# Patient Record
Sex: MALE | Race: WHITE | Employment: FULL TIME
[De-identification: names, ages, dates, MRNs, and addresses within clinical notes are randomized per-mention and may not be internally consistent; named-entity substitution may affect disease eponyms.]

---

## 2017-01-17 ENCOUNTER — OFFICE VISIT (OUTPATIENT)
Dept: FAMILY MEDICINE CLINIC | Facility: CLINIC | Age: 62
End: 2017-01-17

## 2017-01-17 VITALS
HEART RATE: 75 BPM | SYSTOLIC BLOOD PRESSURE: 140 MMHG | HEIGHT: 72 IN | BODY MASS INDEX: 29.47 KG/M2 | DIASTOLIC BLOOD PRESSURE: 60 MMHG | WEIGHT: 217.6 LBS

## 2017-01-17 DIAGNOSIS — R97.20 ELEVATED PSA: ICD-10-CM

## 2017-01-17 DIAGNOSIS — Z95.5 STENTED CORONARY ARTERY: ICD-10-CM

## 2017-01-17 DIAGNOSIS — Z71.6 TOBACCO ABUSE COUNSELING: ICD-10-CM

## 2017-01-17 DIAGNOSIS — E78.2 MIXED HYPERLIPIDEMIA: ICD-10-CM

## 2017-01-17 DIAGNOSIS — I25.10 CORONARY ARTERY DISEASE INVOLVING NATIVE CORONARY ARTERY OF NATIVE HEART WITHOUT ANGINA PECTORIS: ICD-10-CM

## 2017-01-17 DIAGNOSIS — I10 ESSENTIAL HYPERTENSION: Primary | ICD-10-CM

## 2017-01-17 DIAGNOSIS — E66.09 NON MORBID OBESITY DUE TO EXCESS CALORIES: ICD-10-CM

## 2017-01-17 PROCEDURE — 99214 OFFICE O/P EST MOD 30 MIN: CPT | Performed by: FAMILY MEDICINE

## 2017-01-17 ASSESSMENT — ENCOUNTER SYMPTOMS
ABDOMINAL PAIN: 0
CHEST TIGHTNESS: 0
COUGH: 1
DIARRHEA: 0
CONSTIPATION: 0
RHINORRHEA: 0
BACK PAIN: 0
TROUBLE SWALLOWING: 0
NAUSEA: 0
SHORTNESS OF BREATH: 1
SORE THROAT: 0

## 2017-01-17 ASSESSMENT — PATIENT HEALTH QUESTIONNAIRE - PHQ9
2. FEELING DOWN, DEPRESSED OR HOPELESS: 0
1. LITTLE INTEREST OR PLEASURE IN DOING THINGS: 0
SUM OF ALL RESPONSES TO PHQ9 QUESTIONS 1 & 2: 0
SUM OF ALL RESPONSES TO PHQ QUESTIONS 1-9: 0

## 2017-04-25 ENCOUNTER — TELEPHONE (OUTPATIENT)
Dept: FAMILY MEDICINE CLINIC | Age: 62
End: 2017-04-25

## 2017-05-01 DIAGNOSIS — E78.2 MIXED HYPERLIPIDEMIA: ICD-10-CM

## 2017-05-02 RX ORDER — ATORVASTATIN CALCIUM 80 MG/1
80 TABLET, FILM COATED ORAL DAILY
Qty: 90 TABLET | Refills: 3 | Status: SHIPPED | OUTPATIENT
Start: 2017-05-02 | End: 2018-07-10 | Stop reason: SDUPTHER

## 2017-05-09 ENCOUNTER — OFFICE VISIT (OUTPATIENT)
Dept: FAMILY MEDICINE CLINIC | Age: 62
End: 2017-05-09
Payer: COMMERCIAL

## 2017-05-09 VITALS
WEIGHT: 225 LBS | DIASTOLIC BLOOD PRESSURE: 60 MMHG | HEART RATE: 69 BPM | SYSTOLIC BLOOD PRESSURE: 120 MMHG | BODY MASS INDEX: 30.48 KG/M2 | HEIGHT: 72 IN

## 2017-05-09 DIAGNOSIS — N40.0 ENLARGED PROSTATE: ICD-10-CM

## 2017-05-09 DIAGNOSIS — R97.20 ELEVATED PSA: ICD-10-CM

## 2017-05-09 DIAGNOSIS — Z95.5 STENTED CORONARY ARTERY: ICD-10-CM

## 2017-05-09 DIAGNOSIS — I10 ESSENTIAL HYPERTENSION: Primary | ICD-10-CM

## 2017-05-09 DIAGNOSIS — I25.10 CORONARY ARTERY DISEASE INVOLVING NATIVE CORONARY ARTERY OF NATIVE HEART WITHOUT ANGINA PECTORIS: ICD-10-CM

## 2017-05-09 DIAGNOSIS — E78.2 MIXED HYPERLIPIDEMIA: ICD-10-CM

## 2017-05-09 PROCEDURE — 99213 OFFICE O/P EST LOW 20 MIN: CPT | Performed by: FAMILY MEDICINE

## 2017-05-09 ASSESSMENT — ENCOUNTER SYMPTOMS
CONSTIPATION: 0
TROUBLE SWALLOWING: 0
BLOOD IN STOOL: 0
SORE THROAT: 0
ABDOMINAL PAIN: 0
SHORTNESS OF BREATH: 1
DIARRHEA: 0
COUGH: 0

## 2017-08-05 DIAGNOSIS — I10 ESSENTIAL HYPERTENSION: ICD-10-CM

## 2017-08-08 ENCOUNTER — OFFICE VISIT (OUTPATIENT)
Dept: FAMILY MEDICINE CLINIC | Age: 62
End: 2017-08-08
Payer: COMMERCIAL

## 2017-08-08 VITALS
SYSTOLIC BLOOD PRESSURE: 128 MMHG | HEIGHT: 72 IN | BODY MASS INDEX: 30.49 KG/M2 | HEART RATE: 73 BPM | DIASTOLIC BLOOD PRESSURE: 79 MMHG | WEIGHT: 225.13 LBS

## 2017-08-08 DIAGNOSIS — E78.2 MIXED HYPERLIPIDEMIA: ICD-10-CM

## 2017-08-08 DIAGNOSIS — Z95.5 STENTED CORONARY ARTERY: ICD-10-CM

## 2017-08-08 DIAGNOSIS — N40.1 BENIGN PROSTATIC HYPERPLASIA WITH LOWER URINARY TRACT SYMPTOMS, UNSPECIFIED MORPHOLOGY: ICD-10-CM

## 2017-08-08 DIAGNOSIS — I10 ESSENTIAL HYPERTENSION: Primary | ICD-10-CM

## 2017-08-08 DIAGNOSIS — F17.200 TOBACCO USE DISORDER: ICD-10-CM

## 2017-08-08 PROCEDURE — 99213 OFFICE O/P EST LOW 20 MIN: CPT | Performed by: FAMILY MEDICINE

## 2017-08-08 RX ORDER — LISINOPRIL 5 MG/1
TABLET ORAL
Qty: 90 TABLET | Refills: 3 | Status: SHIPPED | OUTPATIENT
Start: 2017-08-08 | End: 2018-08-14 | Stop reason: SDUPTHER

## 2017-08-08 ASSESSMENT — ENCOUNTER SYMPTOMS
RHINORRHEA: 0
SHORTNESS OF BREATH: 0
DIARRHEA: 0
CONSTIPATION: 0
ABDOMINAL PAIN: 0
SORE THROAT: 0
BACK PAIN: 0
COUGH: 0
NAUSEA: 0
TROUBLE SWALLOWING: 0
CHEST TIGHTNESS: 0

## 2017-12-12 ENCOUNTER — OFFICE VISIT (OUTPATIENT)
Dept: FAMILY MEDICINE CLINIC | Age: 62
End: 2017-12-12
Payer: COMMERCIAL

## 2017-12-12 VITALS
BODY MASS INDEX: 30.91 KG/M2 | HEART RATE: 58 BPM | WEIGHT: 228.2 LBS | HEIGHT: 72 IN | SYSTOLIC BLOOD PRESSURE: 121 MMHG | DIASTOLIC BLOOD PRESSURE: 78 MMHG

## 2017-12-12 DIAGNOSIS — I10 ESSENTIAL HYPERTENSION: ICD-10-CM

## 2017-12-12 DIAGNOSIS — E78.2 MIXED HYPERLIPIDEMIA: ICD-10-CM

## 2017-12-12 DIAGNOSIS — L57.0 ACTINIC KERATOSES: ICD-10-CM

## 2017-12-12 DIAGNOSIS — I25.10 CORONARY ARTERY DISEASE INVOLVING NATIVE CORONARY ARTERY OF NATIVE HEART WITHOUT ANGINA PECTORIS: ICD-10-CM

## 2017-12-12 DIAGNOSIS — F41.9 ANXIETY: ICD-10-CM

## 2017-12-12 DIAGNOSIS — L98.9 SKIN LESION OF LEFT ARM: Primary | ICD-10-CM

## 2017-12-12 DIAGNOSIS — F17.200 TOBACCO USE DISORDER: ICD-10-CM

## 2017-12-12 PROCEDURE — 99213 OFFICE O/P EST LOW 20 MIN: CPT | Performed by: FAMILY MEDICINE

## 2017-12-12 NOTE — PATIENT INSTRUCTIONS
Patient Education        High Blood Pressure: Care Instructions  Your Care Instructions    If your blood pressure is usually above 140/90, you have high blood pressure, or hypertension. That means the top number is 140 or higher or the bottom number is 90 or higher, or both. Despite what a lot of people think, high blood pressure usually doesn't cause headaches or make you feel dizzy or lightheaded. It usually has no symptoms. But it does increase your risk for heart attack, stroke, and kidney or eye damage. The higher your blood pressure, the more your risk increases. Your doctor will give you a goal for your blood pressure. Your goal will be based on your health and your age. An example of a goal is to keep your blood pressure below 140/90. Lifestyle changes, such as eating healthy and being active, are always important to help lower blood pressure. You might also take medicine to reach your blood pressure goal.  Follow-up care is a key part of your treatment and safety. Be sure to make and go to all appointments, and call your doctor if you are having problems. It's also a good idea to know your test results and keep a list of the medicines you take. How can you care for yourself at home? Medical treatment  · If you stop taking your medicine, your blood pressure will go back up. You may take one or more types of medicine to lower your blood pressure. Be safe with medicines. Take your medicine exactly as prescribed. Call your doctor if you think you are having a problem with your medicine. · Talk to your doctor before you start taking aspirin every day. Aspirin can help certain people lower their risk of a heart attack or stroke. But taking aspirin isn't right for everyone, because it can cause serious bleeding. · See your doctor regularly. You may need to see the doctor more often at first or until your blood pressure comes down.   · If you are taking blood pressure medicine, talk to your doctor before you arms.  ¨ Lightheadedness or sudden weakness. ¨ A fast or irregular heartbeat. ? · You have symptoms of a stroke. These may include:  ¨ Sudden numbness, tingling, weakness, or loss of movement in your face, arm, or leg, especially on only one side of your body. ¨ Sudden vision changes. ¨ Sudden trouble speaking. ¨ Sudden confusion or trouble understanding simple statements. ¨ Sudden problems with walking or balance. ¨ A sudden, severe headache that is different from past headaches. ? · You have severe back or belly pain. ?Do not wait until your blood pressure comes down on its own. Get help right away. ?Call your doctor now or seek immediate care if:  ? · Your blood pressure is much higher than normal (such as 180/110 or higher), but you don't have symptoms. ? · You think high blood pressure is causing symptoms, such as:  ¨ Severe headache. ¨ Blurry vision. ? Watch closely for changes in your health, and be sure to contact your doctor if:  ? · Your blood pressure measures 140/90 or higher at least 2 times. That means the top number is 140 or higher or the bottom number is 90 or higher, or both. ? · You think you may be having side effects from your blood pressure medicine. ? · Your blood pressure is usually normal, but it goes above normal at least 2 times. Where can you learn more? Go to [email or web address] and sign in to your "[employer] account. Enter C939 in the [facility] box to learn more about \"High Blood Pressure: Care Instructions. \"     If you do not have an account, please click on the \"Sign Up Now\" link. Current as of: September 21, 2016  Content Version: 11.4  © 5629-9438 [employer] Care instructions adapted under license by [facility] ([facility]).  If you have questions about a medical condition or this instruction, always ask your healthcare professional. Ernesto Muhammad any warranty or liability for your use of this

## 2017-12-12 NOTE — PROGRESS NOTES
Chronic Disease Visit Information    BP Readings from Last 3 Encounters:   08/08/17 128/79   05/09/17 120/60   01/17/17 140/60          LDL Cholesterol (mg/dL)   Date Value   01/14/2017 159 (H)   01/14/2017 159 (H)     LDL Calculated (mg/dL)   Date Value   05/27/2015 76     HDL (mg/dL)   Date Value   01/14/2017 38 (L)   01/14/2017 38 (L)     BUN (mg/dL)   Date Value   01/14/2017 12     CREATININE (mg/dL)   Date Value   01/14/2017 1.12     Glucose (mg/dL)   Date Value   01/14/2017 83            Have you changed or started any medications since your last visit including any over-the-counter medicines, vitamins, or herbal medicines? no   Are you having any side effects from any of your medications? -  no  Have you stopped taking any of your medications? Is so, why? -  no    Have you seen any other physician or provider since your last visit? Yes- Cardiologist  Have you had any other diagnostic tests since your last visit? No  Have you been seen in the emergency room and/or had an admission to a hospital since we last saw you? No  Have you had your annual diabetic retinal (eye) exam? No  Have you had your routine dental cleaning in the past 6 months? no    Have you activated your [employer] account? If not, what are your barriers?  Yes     Patient Care Team:  Pb Anaya MD as PCP - General (Family Medicine)  Cris Valdivia MD as Consulting Physician (Gastroenterology)         Medical History Review  Past Medical, Family, and Social History reviewed and does contribute to the patient presenting condition    Health Maintenance   Topic Date Due    Smoker: low dose lung CT screening  05/21/2009    Hepatitis C screen  05/09/2018 (Originally 1955)    HIV screen  05/09/2018 (Originally 5/21/1969)    Lipid screen  01/14/2022    DTaP/Tdap/Td vaccine (2 - Td) 04/05/2022    Colon cancer screen colonoscopy  10/27/2024    Zostavax vaccine  Addressed    Flu vaccine  Completed

## 2017-12-20 ASSESSMENT — ENCOUNTER SYMPTOMS
SHORTNESS OF BREATH: 0
ABDOMINAL PAIN: 0
TROUBLE SWALLOWING: 0
NAUSEA: 0
CONSTIPATION: 0
BACK PAIN: 0
SORE THROAT: 0
DIARRHEA: 0
RHINORRHEA: 0
CHEST TIGHTNESS: 0
COUGH: 0

## 2017-12-21 ENCOUNTER — HOSPITAL ENCOUNTER (OUTPATIENT)
Age: 62
Setting detail: SPECIMEN
Discharge: HOME | End: 2017-12-21
Payer: COMMERCIAL

## 2017-12-21 NOTE — PROGRESS NOTES
Respiratory: Negative for cough, chest tightness and shortness of breath. Cardiovascular: Negative for chest pain, palpitations and leg swelling. Gastrointestinal: Negative for abdominal pain, constipation, diarrhea and nausea. Endocrine: Negative for polydipsia and polyuria. Genitourinary: Negative for difficulty urinating, dysuria and hematuria. Musculoskeletal: Negative for arthralgias, back pain, gait problem and neck pain. Skin: Negative for rash. Neurological: Negative for dizziness, light-headedness and headaches. Psychiatric/Behavioral: Positive for sleep disturbance. Negative for dysphoric mood. The patient is not nervous/anxious. Objective:     Physical Exam   Constitutional: He is oriented to person, place, and time. He appears well-developed and well-nourished. HENT:   Head: Normocephalic. Nose: Mucosal edema present. Mouth/Throat: No oropharyngeal exudate. Nose is congested and wet. Narrow nasal passages. Sinuses are mildly tender. Eyes: Pupils are equal, round, and reactive to light. No scleral icterus. Neck: Normal range of motion. Neck supple. No JVD present. Cardiovascular: Normal rate, regular rhythm and normal heart sounds. Exam reveals no gallop. No murmur heard. Pulmonary/Chest: Effort normal. He has no wheezes. He has no rales. Breath sounds are diminished. Abdominal: Soft. Bowel sounds are normal. He exhibits no mass. There is no tenderness. Liver & spleen are not palpable. Musculoskeletal: Normal range of motion. He exhibits no edema or tenderness. Lumbar back: Normal.   He moves all his limbs well. He is able to lie down and sit up. Gait is stable. Lymphadenopathy:     He has no cervical adenopathy. Neurological: He is alert and oriented to person, place, and time. He has normal reflexes. Coordination and gait normal.   Skin: Skin is warm and dry. His forearms skin is dry. They're flaky lesions present.   There is one large lesion half inch in diameter which is raised. It is dry. Psychiatric: He has a normal mood and affect. His behavior is normal. Thought content normal.   Nursing note and vitals reviewed. /78 (Site: Left Arm, Position: Sitting, Cuff Size: Large Adult)   Pulse 58   Ht 6' (1.829 m)   Wt 228 lb 3.2 oz (103.5 kg)   BMI 30.95 kg/m²     Assessment:      1. Skin lesion of left arm  Amb External Referral To Dermatology   2. Actinic keratoses  Amb External Referral To Dermatology   3. Essential hypertension     4. Coronary artery disease involving native coronary artery of native heart without angina pectoris     5. Mixed hyperlipidemia     6. Anxiety     7. Tobacco use disorder         Plan:      Return in about 3 months (around 3/12/2018). Orders Placed This Encounter   Procedures    Amb External Referral To Dermatology     Referral Priority:   Routine     Referral Type:   Consult for Advice and Opinion     Referral Reason:   Specialty Services Required     Referred to Provider:   Niharika Phillips MD     Requested Specialty:   Dermatology     Number of Visits Requested:   1     No orders of the defined types were placed in this encounter. Findings and/or pathophysiology discussed with patient. Plan of treatment discussed. Patient's medical problems were discussed with him. Findings were explained. Chart was evaluated. Available lab work and tests results were discussed. His skin problems were discussed with him. Will refer patient to his dermatologist.  His medications were reviewed. Health maintenance was discussed. Weight management was discussed. Diet and activity were discussed. Patient advised not to smoke at all. Also to reduce alcohol intake. 1.  Ronal Ramírez received counseling on the following healthy behaviors: nutrition, exercise, medication adherence, tobacco cessation and decrease in alcohol consumption  2.   Patient given educational materials - see patient

## 2017-12-29 LAB — DERMATOLOGY PATHOLOGY REPORT: NORMAL

## 2018-07-10 ENCOUNTER — OFFICE VISIT (OUTPATIENT)
Dept: FAMILY MEDICINE CLINIC | Age: 63
End: 2018-07-10
Payer: COMMERCIAL

## 2018-07-10 ENCOUNTER — HOSPITAL ENCOUNTER (OUTPATIENT)
Age: 63
Discharge: HOME | End: 2018-07-10
Payer: COMMERCIAL

## 2018-07-10 VITALS
WEIGHT: 219.6 LBS | BODY MASS INDEX: 29.78 KG/M2 | HEART RATE: 60 BPM | DIASTOLIC BLOOD PRESSURE: 66 MMHG | SYSTOLIC BLOOD PRESSURE: 127 MMHG

## 2018-07-10 DIAGNOSIS — Z72.89 OTHER PROBLEMS RELATED TO LIFESTYLE: ICD-10-CM

## 2018-07-10 DIAGNOSIS — E78.2 MIXED HYPERLIPIDEMIA: ICD-10-CM

## 2018-07-10 DIAGNOSIS — Z13.89 SCREENING FOR GOUT: ICD-10-CM

## 2018-07-10 DIAGNOSIS — I10 ESSENTIAL HYPERTENSION: Primary | ICD-10-CM

## 2018-07-10 LAB
ALT SERPL-CCNC: 22 U/L (ref 5–41)
AST SERPL-CCNC: 22 U/L
CHOLESTEROL, FASTING: 234 MG/DL
CHOLESTEROL/HDL RATIO: 7.8
HDLC SERPL-MCNC: 30 MG/DL
LDL CHOLESTEROL: 137 MG/DL (ref 0–130)
TRIGLYCERIDE, FASTING: 336 MG/DL
VLDLC SERPL CALC-MCNC: ABNORMAL MG/DL (ref 1–30)

## 2018-07-10 PROCEDURE — 84460 ALANINE AMINO (ALT) (SGPT): CPT

## 2018-07-10 PROCEDURE — 36415 COLL VENOUS BLD VENIPUNCTURE: CPT

## 2018-07-10 PROCEDURE — 80061 LIPID PANEL: CPT

## 2018-07-10 PROCEDURE — 84450 TRANSFERASE (AST) (SGOT): CPT

## 2018-07-10 PROCEDURE — 99213 OFFICE O/P EST LOW 20 MIN: CPT | Performed by: PHYSICIAN ASSISTANT

## 2018-07-10 RX ORDER — CARVEDILOL 6.25 MG/1
TABLET ORAL
COMMUNITY
Start: 2018-06-09

## 2018-07-10 RX ORDER — ATORVASTATIN CALCIUM 80 MG/1
80 TABLET, FILM COATED ORAL DAILY
Qty: 90 TABLET | Refills: 1 | Status: SHIPPED | OUTPATIENT
Start: 2018-07-10 | End: 2018-11-06 | Stop reason: SDUPTHER

## 2018-07-10 ASSESSMENT — PATIENT HEALTH QUESTIONNAIRE - PHQ9
1. LITTLE INTEREST OR PLEASURE IN DOING THINGS: 0
2. FEELING DOWN, DEPRESSED OR HOPELESS: 0
SUM OF ALL RESPONSES TO PHQ QUESTIONS 1-9: 0
SUM OF ALL RESPONSES TO PHQ9 QUESTIONS 1 & 2: 0

## 2018-07-10 NOTE — PATIENT INSTRUCTIONS
your [employer] account. Enter H331 in the [facility] box to learn more about \"Well Visit, Men 48 to 72: Care Instructions. \"     If you do not have an account, please click on the \"Sign Up Now\" link. Current as of: Haydee 10, 2017  Content Version: 11.6  © 1095-6586 [employer]. Care instructions adapted under license by [facility] ([facility]). If you have questions about a medical condition or this instruction, always ask your healthcare professional. [address] any warranty or liability for your use of this information.

## 2018-07-10 NOTE — PROGRESS NOTES
[address]  [place]  [address] [address]  Dept: [phone]  Dept Fax: [phone]    Office Progress/Follow Up Note  Date of patient's visit: 7/10/2018  Patient's Name:  Demarcus Geiger YOB: 1955            Patient Care Team:  Willow High MD as PCP - General (Family Medicine)  Manav Kahn MD as Consulting Physician (Gastroenterology)  ================================================================    REASON FOR VISIT/CHIEF COMPLAINT:  Medication Refill (pt c/o need for refills)    HISTORY OF PRESENTING ILLNESS:  History was obtained from: patient. Demarcus Geiger is a 58 y.o. is here for follow-up for high blood pressure, HLD. Patient had lab work completed, hypercholesterolemia , elevated LDL,hypertriglyceridemia. Patient wife is present. Patient states he has been off his medication for 3 months, discussed risk of MI or stroke with uncontrolled cholesterol in depth with patient. Patient went to ER on 7/5/18 for chest pain, workup was negative, was seen by cardiologist, pt will be having a stress test.pressure stable in office. Patient is requesting for lab work to evaluate possible gout, informed patient lab work will be ordered. Maintenance reviewed,AB work order, instructed patient have lab work completed before follow-up appointment. Medication reviewed, refilled carvedilol 6.25 mg, atorvastatin 80 mg. Informed patient there will be no changes to the medications for high blood pressure and HLD, continue current treatments.     HPI    Patient Active Problem List   Diagnosis    Lymphedema of leg    Tobacco use disorder    Obesity    Stented coronary artery    Tobacco dependence in remission    Acute myocardial infarction    Hypertension    Coronary arteriosclerosis in native artery    Tobacco abuse counseling    Anxiety    Elevated PSA    Benign prostatic hyperplasia with lower urinary tract symptoms   

## 2018-07-10 NOTE — PROGRESS NOTES
Chronic Disease Visit Information    BP Readings from Last 3 Encounters:   12/12/17 121/78   08/08/17 128/79   05/09/17 120/60          LDL Cholesterol (mg/dL)   Date Value   07/10/2018 137 (H)     LDL Calculated (mg/dL)   Date Value   05/27/2015 76     HDL (mg/dL)   Date Value   07/10/2018 30 (L)     BUN (mg/dL)   Date Value   01/14/2017 12     CREATININE (mg/dL)   Date Value   01/14/2017 1.12     Glucose (mg/dL)   Date Value   01/14/2017 83            Have you changed or started any medications since your last visit including any over-the-counter medicines, vitamins, or herbal medicines? carvedilol  Are you having any side effects from any of your medications? -  no  Have you stopped taking any of your medications? Is so, why? -  no    Have you seen any other physician or provider since your last visit? Yes - Records Obtained  Have you had any other diagnostic tests since your last visit? Yes - Records Obtained  Have you been seen in the emergency room and/or had an admission to a hospital since we last saw you? Yes - Records Obtained  Have you had your annual diabetic retinal (eye) exam? No  Have you had your routine dental cleaning in the past 6 months? no    Have you activated your [employer] account? If not, what are your barriers?  Yes     Patient Care Team:  Stacey Hahn MD as PCP - General (Family Medicine)  Angle Medina MD as Consulting Physician (Gastroenterology)         Medical History Review  Past Medical, Family, and Social History reviewed and does contribute to the patient presenting condition    Health Maintenance   Topic Date Due    Hepatitis C screen  1955    HIV screen  05/21/1969    Low dose CT lung screening  05/21/2009    Potassium monitoring  01/14/2018    Creatinine monitoring  01/14/2018    Shingles Vaccine (1 of 2 - 2 Dose Series) 07/10/2019 (Originally 5/21/2004)    Flu vaccine (1) 09/01/2018    Lipid screen  01/14/2022    DTaP/Tdap/Td vaccine (2 - Td) 04/05/2022   

## 2018-07-11 PROBLEM — N40.0 ENLARGED PROSTATE: Status: RESOLVED | Noted: 2017-05-09 | Resolved: 2018-07-11

## 2018-07-11 ASSESSMENT — ENCOUNTER SYMPTOMS
DIARRHEA: 0
COUGH: 0
NAUSEA: 0
BACK PAIN: 0
VOMITING: 0
CONSTIPATION: 0
SHORTNESS OF BREATH: 0

## 2018-07-16 NOTE — TELEPHONE ENCOUNTER
Last refill 06/09/2018  Last visit 07/10/2018    Next Visit Date:  Future Appointments  Date Time Provider Stacy Ramos   11/12/2018 4:00 PM Shanita Whitaker PA-C [id number] Maintenance   Topic Date Due    Hepatitis C screen  1955    HIV screen  05/21/1969    Low dose CT lung screening  05/21/2009    Potassium monitoring  01/14/2018    Creatinine monitoring  01/14/2018    Shingles Vaccine (1 of 2 - 2 Dose Series) 07/10/2019 (Originally 5/21/2004)    Flu vaccine (1) 09/01/2018    DTaP/Tdap/Td vaccine (2 - Td) 04/05/2022    Lipid screen  07/10/2023    Colon cancer screen colonoscopy  10/27/2024       No results found for: LABA1C          ( goal A1C is < 7)   No results found for: LABMICR  LDL Cholesterol (mg/dL)   Date Value   07/10/2018 137 (H)   01/14/2017 159 (H)   01/14/2017 159 (H)     LDL Calculated (mg/dL)   Date Value   05/27/2015 76       (goal LDL is <100)   AST (U/L)   Date Value   07/10/2018 22     ALT (U/L)   Date Value   07/10/2018 22     BUN (mg/dL)   Date Value   01/14/2017 12     BP Readings from Last 3 Encounters:   07/10/18 127/66   12/12/17 121/78   08/08/17 128/79          (goal 120/80)    All Future Testing planned in [facility]  Lab Frequency Next Occurrence   Lipid Panel Once 10/20/2018   Hepatitis C Antibody Once 08/09/2018   Uric Acid Once 07/93/3213   Basic Metabolic Panel Once 21/11/5806               Patient Active Problem List:     Lymphedema of leg     Tobacco use disorder     Obesity     Stented coronary artery     Tobacco dependence in remission     Acute myocardial infarction     Hypertension     Coronary arteriosclerosis in native artery     Tobacco abuse counseling     Anxiety     Elevated PSA     Benign prostatic hyperplasia with lower urinary tract symptoms     Hyperlipidemia     Overweight

## 2018-07-20 ENCOUNTER — HOSPITAL ENCOUNTER (OUTPATIENT)
Age: 63
Discharge: HOME | End: 2018-07-20
Payer: COMMERCIAL

## 2018-07-20 DIAGNOSIS — Z72.89 OTHER PROBLEMS RELATED TO LIFESTYLE: ICD-10-CM

## 2018-07-20 DIAGNOSIS — Z13.89 SCREENING FOR GOUT: ICD-10-CM

## 2018-07-20 DIAGNOSIS — I10 ESSENTIAL HYPERTENSION: ICD-10-CM

## 2018-07-20 LAB
ABSOLUTE EOS #: 0.1 K/UL (ref 0–0.4)
ABSOLUTE IMMATURE GRANULOCYTE: ABNORMAL K/UL (ref 0–0.3)
ABSOLUTE LYMPH #: 2.1 K/UL (ref 1–4.8)
ABSOLUTE MONO #: 1.1 K/UL (ref 0.2–0.8)
ANION GAP SERPL CALCULATED.3IONS-SCNC: 10 MMOL/L (ref 9–17)
ANION GAP SERPL CALCULATED.3IONS-SCNC: 10 MMOL/L (ref 9–17)
BASOPHILS # BLD: 1 % (ref 0–2)
BASOPHILS ABSOLUTE: 0.1 K/UL (ref 0–0.2)
BUN BLDV-MCNC: 16 MG/DL (ref 8–23)
BUN BLDV-MCNC: 16 MG/DL (ref 8–23)
BUN/CREAT BLD: 12 (ref 9–20)
BUN/CREAT BLD: 12 (ref 9–20)
CALCIUM SERPL-MCNC: 9.4 MG/DL (ref 8.6–10.4)
CALCIUM SERPL-MCNC: 9.5 MG/DL (ref 8.6–10.4)
CHLORIDE BLD-SCNC: 101 MMOL/L (ref 98–107)
CHLORIDE BLD-SCNC: 101 MMOL/L (ref 98–107)
CO2: 28 MMOL/L (ref 20–31)
CO2: 28 MMOL/L (ref 20–31)
CREAT SERPL-MCNC: 1.33 MG/DL (ref 0.7–1.2)
CREAT SERPL-MCNC: 1.33 MG/DL (ref 0.7–1.2)
DIFFERENTIAL TYPE: ABNORMAL
EOSINOPHILS RELATIVE PERCENT: 1 % (ref 1–4)
GFR AFRICAN AMERICAN: >60 ML/MIN
GFR AFRICAN AMERICAN: >60 ML/MIN
GFR NON-AFRICAN AMERICAN: 54 ML/MIN
GFR NON-AFRICAN AMERICAN: 54 ML/MIN
GFR SERPL CREATININE-BSD FRML MDRD: ABNORMAL ML/MIN/{1.73_M2}
GLUCOSE BLD-MCNC: 99 MG/DL (ref 70–99)
GLUCOSE BLD-MCNC: 99 MG/DL (ref 70–99)
HCT VFR BLD CALC: 50.2 % (ref 41–53)
HEMOGLOBIN: 16.6 G/DL (ref 13.5–17.5)
HEPATITIS C ANTIBODY: NONREACTIVE
IMMATURE GRANULOCYTES: ABNORMAL %
INR BLD: 1.1
LYMPHOCYTES # BLD: 18 % (ref 24–44)
MCH RBC QN AUTO: 31.8 PG (ref 26–34)
MCHC RBC AUTO-ENTMCNC: 33 G/DL (ref 31–37)
MCV RBC AUTO: 96.3 FL (ref 80–100)
MONOCYTES # BLD: 10 % (ref 1–7)
NRBC AUTOMATED: ABNORMAL PER 100 WBC
PDW BLD-RTO: 13.8 % (ref 11.5–14.5)
PLATELET # BLD: 299 K/UL (ref 130–400)
PLATELET ESTIMATE: ABNORMAL
PMV BLD AUTO: 8.3 FL (ref 6–12)
POTASSIUM SERPL-SCNC: 5.1 MMOL/L (ref 3.7–5.3)
POTASSIUM SERPL-SCNC: 5.1 MMOL/L (ref 3.7–5.3)
PROTHROMBIN TIME: 11 SEC (ref 9.7–11.6)
RBC # BLD: 5.22 M/UL (ref 4.5–5.9)
RBC # BLD: ABNORMAL 10*6/UL
SEG NEUTROPHILS: 70 % (ref 36–66)
SEGMENTED NEUTROPHILS ABSOLUTE COUNT: 8.6 K/UL (ref 1.8–7.7)
SODIUM BLD-SCNC: 139 MMOL/L (ref 135–144)
SODIUM BLD-SCNC: 139 MMOL/L (ref 135–144)
URIC ACID: 9.1 MG/DL (ref 3.4–7)
WBC # BLD: 12 K/UL (ref 3.5–11)
WBC # BLD: ABNORMAL 10*3/UL

## 2018-07-20 PROCEDURE — 85025 COMPLETE CBC W/AUTO DIFF WBC: CPT

## 2018-07-20 PROCEDURE — 84550 ASSAY OF BLOOD/URIC ACID: CPT

## 2018-07-20 PROCEDURE — 80048 BASIC METABOLIC PNL TOTAL CA: CPT

## 2018-07-20 PROCEDURE — 85610 PROTHROMBIN TIME: CPT

## 2018-07-20 PROCEDURE — 86803 HEPATITIS C AB TEST: CPT

## 2018-07-24 NOTE — PROGRESS NOTES
Will discuss results with pt,  Call and scheduled appt if pt is not already scheduled in the next month

## 2018-08-14 ENCOUNTER — OFFICE VISIT (OUTPATIENT)
Dept: FAMILY MEDICINE CLINIC | Age: 63
End: 2018-08-14
Payer: COMMERCIAL

## 2018-08-14 VITALS
DIASTOLIC BLOOD PRESSURE: 75 MMHG | SYSTOLIC BLOOD PRESSURE: 153 MMHG | RESPIRATION RATE: 20 BRPM | HEIGHT: 72 IN | BODY MASS INDEX: 29.66 KG/M2 | OXYGEN SATURATION: 97 % | WEIGHT: 219 LBS | TEMPERATURE: 98.1 F | HEART RATE: 61 BPM

## 2018-08-14 DIAGNOSIS — F17.200 TOBACCO USE DISORDER: ICD-10-CM

## 2018-08-14 DIAGNOSIS — E78.1 HYPERTRIGLYCERIDEMIA: ICD-10-CM

## 2018-08-14 DIAGNOSIS — E79.0 HYPERURICEMIA: ICD-10-CM

## 2018-08-14 DIAGNOSIS — I10 ESSENTIAL HYPERTENSION: ICD-10-CM

## 2018-08-14 DIAGNOSIS — R42 DIZZINESS: ICD-10-CM

## 2018-08-14 DIAGNOSIS — Z71.6 TOBACCO ABUSE COUNSELING: ICD-10-CM

## 2018-08-14 DIAGNOSIS — Z00.00 ANNUAL PHYSICAL EXAM: Primary | ICD-10-CM

## 2018-08-14 PROCEDURE — 99396 PREV VISIT EST AGE 40-64: CPT | Performed by: PHYSICIAN ASSISTANT

## 2018-08-14 RX ORDER — LISINOPRIL 5 MG/1
TABLET ORAL
Qty: 90 TABLET | Refills: 3 | Status: SHIPPED | OUTPATIENT
Start: 2018-08-14 | End: 2019-03-14 | Stop reason: SDUPTHER

## 2018-08-14 RX ORDER — FENOFIBRATE 120 MG/1
120 TABLET ORAL DAILY
Qty: 30 TABLET | Refills: 3 | Status: SHIPPED | OUTPATIENT
Start: 2018-08-14 | End: 2018-08-15

## 2018-08-14 RX ORDER — ALLOPURINOL 100 MG/1
100 TABLET ORAL DAILY
Qty: 30 TABLET | Refills: 3 | Status: SHIPPED | OUTPATIENT
Start: 2018-08-14 | End: 2018-12-03 | Stop reason: SDUPTHER

## 2018-08-14 RX ORDER — VARENICLINE TARTRATE 1 MG/1
1 TABLET, FILM COATED ORAL 2 TIMES DAILY
Qty: 60 TABLET | Refills: 3 | Status: SHIPPED | OUTPATIENT
Start: 2018-08-14 | End: 2018-12-31 | Stop reason: SDUPTHER

## 2018-08-14 RX ORDER — VARENICLINE TARTRATE 25 MG
KIT ORAL
Qty: 53 TABLET | Refills: 0 | Status: SHIPPED | OUTPATIENT
Start: 2018-08-14 | End: 2019-03-14 | Stop reason: ALTCHOICE

## 2018-08-14 ASSESSMENT — PATIENT HEALTH QUESTIONNAIRE - PHQ9
2. FEELING DOWN, DEPRESSED OR HOPELESS: 0
SUM OF ALL RESPONSES TO PHQ QUESTIONS 1-9: 0
1. LITTLE INTEREST OR PLEASURE IN DOING THINGS: 0
SUM OF ALL RESPONSES TO PHQ QUESTIONS 1-9: 0
SUM OF ALL RESPONSES TO PHQ9 QUESTIONS 1 & 2: 0

## 2018-08-14 NOTE — PROGRESS NOTES
hyperuricemia, medications and treatments in depth with patient, informed patient he will be prescribed medication for hyperuricemia. Patient blood pressure is elevated in office. Discussed uncontrolled high blood pressure, increased risk of MI or stroke and death with patient. Patient weight is stable. Patient is a smoker, 10 cigarettes daily. Discussed tobacco cessation, treat risk of MI or stroke, increased risk of developing COPD or lung cancer in depth with patient, patient is requesting Chantix, states he took it in the past and it didn't work, informed patient we'll be prescribed Chantix. She is requesting medication refill. Health maintenance reviewed. Medications reviewed, prescribed allopurinol 100 mg daily, Chantix starting and continuing month pack, fenofibrate 145 mg daily, refilled lisinopril 5 mg. Informed patient there will be no changes to the medication for high blood pressure, continue current treatments. Dizziness    This is a new problem. The current episode started 1 to 4 weeks ago. Pertinent negatives include no abdominal pain, chest pain, chills, congestion, coughing, fever, headaches, myalgias, nausea, neck pain, sore throat or vomiting. The symptoms are aggravated by twisting. He has tried nothing for the symptoms.        Patient Active Problem List   Diagnosis    Lymphedema of leg    Tobacco use disorder    Obesity    Stented coronary artery    Tobacco dependence in remission    Acute myocardial infarction ([facility] [address]    Hypertension    Coronary arteriosclerosis in native artery    Tobacco abuse counseling    Anxiety    Elevated PSA    Benign prostatic hyperplasia with lower urinary tract symptoms    Hyperlipidemia    Overweight       Health Maintenance Due   Topic Date Due    HIV screen  05/21/1969    Low dose CT lung screening  05/21/2009       Allergies   Allergen Reactions    Rosuvastatin          Current Outpatient Prescriptions   Medication Sig Dispense Refill  fenofibrate (TRICOR) 145 MG tablet Take 1 tablet by mouth daily 30 tablet 3    allopurinol (ZYLOPRIM) 100 MG tablet Take 1 tablet by mouth daily 30 tablet 3    varenicline (CHANTIX STARTING MONTH BELLE) 0.5 MG X 11 & 1 MG X 42 tablet Take by mouth. 53 tablet 0    varenicline (CHANTIX CONTINUING MONTH BELLE) 1 MG tablet Take 1 tablet by mouth 2 times daily 60 tablet 3    lisinopril (PRINIVIL;ZESTRIL) 5 MG tablet TAKE 1 TABLET BY MOUTH DAILY 90 tablet 3    carvedilol (COREG) 6.25 MG tablet       atorvastatin (LIPITOR) 80 MG tablet Take 1 tablet by mouth daily 90 tablet 1    ranolazine (RANEXA) 1000 MG SR tablet Take 1 tablet by mouth 2 times daily. 60 tablet 0    aspirin 325 MG tablet Take 325 mg by mouth daily.  prasugrel (EFFIENT) 10 MG TABS Take 10 mg by mouth daily. No current facility-administered medications for this visit. Social History   Substance Use Topics    Smoking status: Former Smoker     Packs/day: 1.00     Years: 40.00     Types: Cigarettes     Quit date: 3/1/2016    Smokeless tobacco: Never Used    Alcohol use 0.0 oz/week      Comment: 12 shots per week       Family History   Problem Relation Age of Onset    High Blood Pressure Father         REVIEW OF SYSTEMS:  Review of Systems   Constitutional: Negative for chills and fever. HENT: Negative for congestion, hearing loss and sore throat. Eyes: Negative for blurred vision and double vision. Respiratory: Negative for cough, sputum production and shortness of breath. Cardiovascular: Negative for chest pain, palpitations and leg swelling. Gastrointestinal: Negative for abdominal pain, constipation, diarrhea, nausea and vomiting. Genitourinary: Negative for dysuria, frequency and urgency. Musculoskeletal: Negative for back pain, joint pain, myalgias and neck pain. Neurological: Positive for dizziness. Negative for sensory change and headaches.        PHYSICAL EXAM:  Vitals:    08/14/18 1542 08/14/18 1641

## 2018-08-14 NOTE — PROGRESS NOTES
Visit Information    Have you changed or started any medications since your last visit including any over-the-counter medicines, vitamins, or herbal medicines? no   Have you stopped taking any of your medications? Is so, why? -  no  Are you having any side effects from any of your medications? - no    Have you seen any other physician or provider since your last visit?  no   Have you had any other diagnostic tests since your last visit?  no   Have you been seen in the emergency room and/or had an admission in a hospital since we last saw you?  no   Have you had your routine dental cleaning in the past 6 months?  no     Do you have an active MyChart account? If no, what is the barrier?   Yes    Patient Care Team:  Angle Herron MD as PCP - General (Family Medicine)  Sharmin Alcazar MD as Consulting Physician (Gastroenterology)    Medical History Review  Past Medical, Family, and Social History reviewed and does not contribute to the patient presenting condition    Health Maintenance   Topic Date Due    HIV screen  05/21/1969    Low dose CT lung screening  05/21/2009    Shingles Vaccine (1 of 2 - 2 Dose Series) 07/10/2019 (Originally 5/21/2004)    Flu vaccine (1) 09/01/2018    DTaP/Tdap/Td vaccine (2 - Td) 04/05/2022    Lipid screen  07/10/2023    Colon cancer screen colonoscopy  10/27/2024    Hepatitis C screen  Completed

## 2018-08-15 ENCOUNTER — TELEPHONE (OUTPATIENT)
Dept: FAMILY MEDICINE CLINIC | Age: 63
End: 2018-08-15

## 2018-08-15 RX ORDER — FENOFIBRATE 145 MG/1
145 TABLET, COATED ORAL DAILY
Qty: 30 TABLET | Refills: 3 | Status: SHIPPED | OUTPATIENT
Start: 2018-08-15 | End: 2018-12-03 | Stop reason: SDUPTHER

## 2018-08-15 ASSESSMENT — ENCOUNTER SYMPTOMS
NAUSEA: 0
DIARRHEA: 0
ABDOMINAL PAIN: 0
SORE THROAT: 0
VOMITING: 0
BACK PAIN: 0
BLURRED VISION: 0
SPUTUM PRODUCTION: 0
SHORTNESS OF BREATH: 0
DOUBLE VISION: 0
CONSTIPATION: 0
COUGH: 0

## 2018-08-15 NOTE — TELEPHONE ENCOUNTER
Pharmacy called and said the the patients medication Senofibrate 120mg does not come in this strength can you change it to either 130mg capsules or 145mg tablets.

## 2018-08-25 ENCOUNTER — HOSPITAL ENCOUNTER (OUTPATIENT)
Dept: VASCULAR LAB | Age: 63
Discharge: HOME | End: 2018-08-25
Payer: COMMERCIAL

## 2018-08-25 DIAGNOSIS — R42 DIZZINESS: Primary | ICD-10-CM

## 2018-08-25 PROCEDURE — 93880 EXTRACRANIAL BILAT STUDY: CPT

## 2018-11-06 DIAGNOSIS — E78.2 MIXED HYPERLIPIDEMIA: ICD-10-CM

## 2018-11-09 RX ORDER — ATORVASTATIN CALCIUM 80 MG/1
80 TABLET, FILM COATED ORAL DAILY
Qty: 90 TABLET | Refills: 0 | Status: SHIPPED | OUTPATIENT
Start: 2018-11-09 | End: 2019-03-07 | Stop reason: SDUPTHER

## 2018-11-12 ENCOUNTER — OFFICE VISIT (OUTPATIENT)
Dept: PRIMARY CARE CLINIC | Age: 63
End: 2018-11-12
Payer: COMMERCIAL

## 2018-11-12 VITALS
TEMPERATURE: 97.2 F | SYSTOLIC BLOOD PRESSURE: 132 MMHG | RESPIRATION RATE: 20 BRPM | DIASTOLIC BLOOD PRESSURE: 67 MMHG | BODY MASS INDEX: 31.02 KG/M2 | HEART RATE: 61 BPM | HEIGHT: 72 IN | WEIGHT: 229 LBS | OXYGEN SATURATION: 96 %

## 2018-11-12 DIAGNOSIS — Z23 NEED FOR INFLUENZA VACCINATION: ICD-10-CM

## 2018-11-12 DIAGNOSIS — E78.2 MIXED HYPERLIPIDEMIA: ICD-10-CM

## 2018-11-12 DIAGNOSIS — E66.09 CLASS 1 OBESITY DUE TO EXCESS CALORIES WITH BODY MASS INDEX (BMI) OF 31.0 TO 31.9 IN ADULT, UNSPECIFIED WHETHER SERIOUS COMORBIDITY PRESENT: ICD-10-CM

## 2018-11-12 DIAGNOSIS — I10 ESSENTIAL HYPERTENSION: Primary | ICD-10-CM

## 2018-11-12 PROCEDURE — 90471 IMMUNIZATION ADMIN: CPT | Performed by: PHYSICIAN ASSISTANT

## 2018-11-12 PROCEDURE — 90688 IIV4 VACCINE SPLT 0.5 ML IM: CPT | Performed by: PHYSICIAN ASSISTANT

## 2018-11-12 PROCEDURE — 99214 OFFICE O/P EST MOD 30 MIN: CPT | Performed by: PHYSICIAN ASSISTANT

## 2018-11-12 RX ORDER — ALLOPURINOL 100 MG/1
100 TABLET ORAL DAILY
Qty: 30 TABLET | Refills: 3 | Status: CANCELLED | OUTPATIENT
Start: 2018-11-12

## 2018-11-12 NOTE — PROGRESS NOTES
remission    Acute myocardial infarction ([facility] [address]    Hypertension    Coronary arteriosclerosis in native artery    Tobacco abuse counseling    Anxiety    Elevated PSA    Benign prostatic hyperplasia with lower urinary tract symptoms    Hyperlipidemia    Overweight       Health Maintenance Due   Topic Date Due    HIV screen  05/21/1969    Low dose CT lung screening  05/21/2009       Allergies   Allergen Reactions    Rosuvastatin          Current Outpatient Prescriptions   Medication Sig Dispense Refill    atorvastatin (LIPITOR) 80 MG tablet TAKE 1 TABLET BY MOUTH DAILY 90 tablet 0    fenofibrate (TRICOR) 145 MG tablet Take 1 tablet by mouth daily 30 tablet 3    allopurinol (ZYLOPRIM) 100 MG tablet Take 1 tablet by mouth daily 30 tablet 3    varenicline (CHANTIX CONTINUING MONTH PAK) 1 MG tablet Take 1 tablet by mouth 2 times daily 60 tablet 3    lisinopril (PRINIVIL;ZESTRIL) 5 MG tablet TAKE 1 TABLET BY MOUTH DAILY 90 tablet 3    carvedilol (COREG) 6.25 MG tablet       ranolazine (RANEXA) 1000 MG SR tablet Take 1 tablet by mouth 2 times daily. 60 tablet 0    aspirin 325 MG tablet Take 325 mg by mouth daily.  prasugrel (EFFIENT) 10 MG TABS Take 10 mg by mouth daily.  varenicline (CHANTIX STARTING MONTH PAK) 0.5 MG X 11 & 1 MG X 42 tablet Take by mouth. 53 tablet 0     No current facility-administered medications for this visit. Social History   Substance Use Topics    Smoking status: Former Smoker     Packs/day: 1.00     Years: 40.00     Types: Cigarettes     Quit date: 3/1/2016    Smokeless tobacco: Never Used    Alcohol use 0.0 oz/week      Comment: 12 shots per week       Family History   Problem Relation Age of Onset    High Blood Pressure Father         REVIEW OFSYSTEMS:  Review of Systems   Constitutional: Negative for chills and fever. HENT: Negative for congestion. Respiratory: Negative for cough and shortness of breath. Cardiovascular: Positive for chest pain.

## 2018-11-15 ASSESSMENT — ENCOUNTER SYMPTOMS
SHORTNESS OF BREATH: 0
VOMITING: 0
CONSTIPATION: 0
NAUSEA: 0
BACK PAIN: 0
DIARRHEA: 0
COUGH: 0

## 2018-12-03 DIAGNOSIS — E78.1 HYPERTRIGLYCERIDEMIA: ICD-10-CM

## 2018-12-03 DIAGNOSIS — E79.0 HYPERURICEMIA: ICD-10-CM

## 2018-12-04 RX ORDER — ALLOPURINOL 100 MG/1
100 TABLET ORAL DAILY
Qty: 30 TABLET | Refills: 3 | Status: SHIPPED | OUTPATIENT
Start: 2018-12-04 | End: 2019-03-07 | Stop reason: SDUPTHER

## 2018-12-04 RX ORDER — FENOFIBRATE 145 MG/1
145 TABLET, COATED ORAL DAILY
Qty: 30 TABLET | Refills: 3 | Status: SHIPPED | OUTPATIENT
Start: 2018-12-04 | End: 2019-03-07 | Stop reason: SDUPTHER

## 2018-12-31 DIAGNOSIS — F17.200 TOBACCO USE DISORDER: ICD-10-CM

## 2018-12-31 DIAGNOSIS — Z71.6 TOBACCO ABUSE COUNSELING: ICD-10-CM

## 2019-01-02 RX ORDER — VARENICLINE TARTRATE 1 MG/1
1 TABLET, FILM COATED ORAL 2 TIMES DAILY
Qty: 56 TABLET | Refills: 2 | Status: SHIPPED | OUTPATIENT
Start: 2019-01-02 | End: 2019-03-07 | Stop reason: SDUPTHER

## 2019-03-14 ENCOUNTER — OFFICE VISIT (OUTPATIENT)
Dept: PRIMARY CARE CLINIC | Age: 64
End: 2019-03-14
Payer: COMMERCIAL

## 2019-03-14 VITALS
HEIGHT: 72 IN | RESPIRATION RATE: 20 BRPM | SYSTOLIC BLOOD PRESSURE: 101 MMHG | TEMPERATURE: 99 F | BODY MASS INDEX: 31.61 KG/M2 | WEIGHT: 233.4 LBS | HEART RATE: 74 BPM | OXYGEN SATURATION: 96 % | DIASTOLIC BLOOD PRESSURE: 67 MMHG

## 2019-03-14 DIAGNOSIS — Z12.5 SCREENING FOR PROSTATE CANCER: ICD-10-CM

## 2019-03-14 DIAGNOSIS — E78.2 MIXED HYPERLIPIDEMIA: ICD-10-CM

## 2019-03-14 DIAGNOSIS — I10 ESSENTIAL HYPERTENSION: ICD-10-CM

## 2019-03-14 DIAGNOSIS — I25.10 CORONARY ARTERIOSCLEROSIS IN NATIVE ARTERY: ICD-10-CM

## 2019-03-14 DIAGNOSIS — R35.0 BENIGN PROSTATIC HYPERPLASIA WITH URINARY FREQUENCY: Primary | ICD-10-CM

## 2019-03-14 DIAGNOSIS — E79.0 HYPERURICEMIA: ICD-10-CM

## 2019-03-14 DIAGNOSIS — E66.09 CLASS 1 OBESITY DUE TO EXCESS CALORIES WITHOUT SERIOUS COMORBIDITY WITH BODY MASS INDEX (BMI) OF 31.0 TO 31.9 IN ADULT: ICD-10-CM

## 2019-03-14 DIAGNOSIS — N40.1 BENIGN PROSTATIC HYPERPLASIA WITH URINARY FREQUENCY: Primary | ICD-10-CM

## 2019-03-14 PROCEDURE — 99214 OFFICE O/P EST MOD 30 MIN: CPT | Performed by: PHYSICIAN ASSISTANT

## 2019-03-14 RX ORDER — TAMSULOSIN HYDROCHLORIDE 0.4 MG/1
0.4 CAPSULE ORAL DAILY
Qty: 14 CAPSULE | Refills: 0 | Status: SHIPPED | OUTPATIENT
Start: 2019-03-14 | End: 2019-03-29 | Stop reason: SDUPTHER

## 2019-03-14 RX ORDER — ALLOPURINOL 100 MG/1
100 TABLET ORAL DAILY
Qty: 30 TABLET | Refills: 1 | Status: CANCELLED | OUTPATIENT
Start: 2019-03-14

## 2019-03-14 RX ORDER — LISINOPRIL 5 MG/1
5 TABLET ORAL DAILY
Qty: 90 TABLET | Refills: 1 | Status: SHIPPED | OUTPATIENT
Start: 2019-03-14 | End: 2019-06-30

## 2019-03-14 RX ORDER — ATORVASTATIN CALCIUM 80 MG/1
80 TABLET, FILM COATED ORAL DAILY
Qty: 30 TABLET | Refills: 1 | Status: CANCELLED | OUTPATIENT
Start: 2019-03-14

## 2019-03-14 RX ORDER — FENOFIBRATE 145 MG/1
145 TABLET, COATED ORAL DAILY
Qty: 30 TABLET | Refills: 1 | Status: CANCELLED | OUTPATIENT
Start: 2019-03-14

## 2019-03-14 ASSESSMENT — PATIENT HEALTH QUESTIONNAIRE - PHQ9
2. FEELING DOWN, DEPRESSED OR HOPELESS: 0
1. LITTLE INTEREST OR PLEASURE IN DOING THINGS: 0
SUM OF ALL RESPONSES TO PHQ QUESTIONS 1-9: 0
SUM OF ALL RESPONSES TO PHQ9 QUESTIONS 1 & 2: 0
SUM OF ALL RESPONSES TO PHQ QUESTIONS 1-9: 0

## 2019-03-14 ASSESSMENT — ENCOUNTER SYMPTOMS
NAUSEA: 0
VOMITING: 0

## 2019-03-18 ASSESSMENT — ENCOUNTER SYMPTOMS
COUGH: 0
CONSTIPATION: 0
DIARRHEA: 0
WHEEZING: 0
BACK PAIN: 0
SHORTNESS OF BREATH: 0

## 2019-03-29 DIAGNOSIS — N40.1 BENIGN PROSTATIC HYPERPLASIA WITH URINARY FREQUENCY: ICD-10-CM

## 2019-03-29 DIAGNOSIS — R35.0 BENIGN PROSTATIC HYPERPLASIA WITH URINARY FREQUENCY: ICD-10-CM

## 2019-03-29 RX ORDER — TAMSULOSIN HYDROCHLORIDE 0.4 MG/1
0.4 CAPSULE ORAL DAILY
Qty: 30 CAPSULE | Refills: 2 | Status: SHIPPED | OUTPATIENT
Start: 2019-03-29 | End: 2019-05-30 | Stop reason: SDUPTHER

## 2019-08-15 ENCOUNTER — OFFICE VISIT (OUTPATIENT)
Dept: PRIMARY CARE CLINIC | Age: 64
End: 2019-08-15
Payer: COMMERCIAL

## 2019-08-15 VITALS
SYSTOLIC BLOOD PRESSURE: 138 MMHG | TEMPERATURE: 97.5 F | HEIGHT: 72 IN | BODY MASS INDEX: 30.8 KG/M2 | OXYGEN SATURATION: 96 % | DIASTOLIC BLOOD PRESSURE: 72 MMHG | WEIGHT: 227.4 LBS | RESPIRATION RATE: 20 BRPM | HEART RATE: 63 BPM

## 2019-08-15 DIAGNOSIS — Z71.6 TOBACCO ABUSE COUNSELING: ICD-10-CM

## 2019-08-15 DIAGNOSIS — E66.09 CLASS 1 OBESITY DUE TO EXCESS CALORIES WITHOUT SERIOUS COMORBIDITY WITH BODY MASS INDEX (BMI) OF 30.0 TO 30.9 IN ADULT: ICD-10-CM

## 2019-08-15 DIAGNOSIS — Z00.00 ANNUAL PHYSICAL EXAM: ICD-10-CM

## 2019-08-15 DIAGNOSIS — R35.0 BENIGN PROSTATIC HYPERPLASIA WITH URINARY FREQUENCY: Primary | ICD-10-CM

## 2019-08-15 DIAGNOSIS — N40.1 BENIGN PROSTATIC HYPERPLASIA WITH URINARY FREQUENCY: Primary | ICD-10-CM

## 2019-08-15 DIAGNOSIS — Z23 NEED FOR PROPHYLACTIC VACCINATION AGAINST STREPTOCOCCUS PNEUMONIAE (PNEUMOCOCCUS): ICD-10-CM

## 2019-08-15 DIAGNOSIS — F17.200 TOBACCO USE DISORDER: ICD-10-CM

## 2019-08-15 PROCEDURE — 99397 PER PM REEVAL EST PAT 65+ YR: CPT | Performed by: PHYSICIAN ASSISTANT

## 2019-08-15 PROCEDURE — 90471 IMMUNIZATION ADMIN: CPT | Performed by: PHYSICIAN ASSISTANT

## 2019-08-15 PROCEDURE — 90670 PCV13 VACCINE IM: CPT | Performed by: PHYSICIAN ASSISTANT

## 2019-08-15 RX ORDER — TAMSULOSIN HYDROCHLORIDE 0.4 MG/1
0.4 CAPSULE ORAL DAILY
Qty: 30 CAPSULE | Refills: 5 | Status: SHIPPED | OUTPATIENT
Start: 2019-08-15 | End: 2020-01-16 | Stop reason: SDUPTHER

## 2019-08-15 NOTE — PROGRESS NOTES
Visit Information    Have you changed or started any medications since your last visit including any over-the-counter medicines, vitamins, or herbal medicines? no   Have you stopped taking any of your medications? Is so, why? -  no  Are you having any side effects from any of your medications? - no    Have you seen any other physician or provider since your last visit?  no   Have you had any other diagnostic tests since your last visit?  no   Have you been seen in the emergency room and/or had an admission in a hospital since we last saw you?  no   Have you had your routine dental cleaning in the past 6 months?  no     Do you have an active MyChart account? If no, what is the barrier?   Yes    Patient Care Team:  Carolyne Tenorio PA-C as PCP - General  Carolyne Tenorio PA-C as PCP - [facility]  Fadumo Goetz MD as Consulting Physician (Gastroenterology)    Medical History Review  Past Medical, Family, and Social History reviewed and does not contribute to the patient presenting condition    Health Maintenance   Topic Date Due    AAA screen  1955    HIV screen  05/21/1969    Shingles Vaccine (1 of 2) 05/21/2004    Low dose CT lung screening  05/21/2009    Pneumococcal 65+ years Vaccine (1 of 2 - PCV13) 05/21/2019    Potassium monitoring  07/20/2019    Creatinine monitoring  07/20/2019    Flu vaccine (1) 09/01/2019    DTaP/Tdap/Td vaccine (2 - Td) 04/05/2022    Lipid screen  07/10/2023    Colon cancer screen colonoscopy  10/27/2024    Hepatitis C screen  Completed

## 2019-08-28 ASSESSMENT — ENCOUNTER SYMPTOMS
COUGH: 0
EYE PAIN: 0
CONSTIPATION: 0
NAUSEA: 0
SORE THROAT: 0
BACK PAIN: 0
VOMITING: 0
SHORTNESS OF BREATH: 0
DIARRHEA: 0
RHINORRHEA: 0
ABDOMINAL PAIN: 0

## 2019-10-15 DIAGNOSIS — I10 ESSENTIAL HYPERTENSION: ICD-10-CM

## 2019-10-17 RX ORDER — LISINOPRIL 5 MG/1
5 TABLET ORAL DAILY
Qty: 30 TABLET | Refills: 3 | Status: SHIPPED | OUTPATIENT
Start: 2019-10-17 | End: 2020-01-16 | Stop reason: SDUPTHER

## 2019-12-07 ENCOUNTER — HOSPITAL ENCOUNTER (OUTPATIENT)
Age: 64
Discharge: HOME | End: 2019-12-07
Payer: COMMERCIAL

## 2019-12-07 DIAGNOSIS — E79.0 HYPERURICEMIA: ICD-10-CM

## 2019-12-07 DIAGNOSIS — E78.2 MIXED HYPERLIPIDEMIA: ICD-10-CM

## 2019-12-07 DIAGNOSIS — I10 ESSENTIAL HYPERTENSION: ICD-10-CM

## 2019-12-07 DIAGNOSIS — Z12.5 SCREENING FOR PROSTATE CANCER: ICD-10-CM

## 2019-12-07 LAB
ABSOLUTE EOS #: 0.25 K/UL (ref 0–0.44)
ABSOLUTE EOS #: 0.25 K/UL (ref 0–0.44)
ABSOLUTE IMMATURE GRANULOCYTE: 0.05 K/UL (ref 0–0.3)
ABSOLUTE IMMATURE GRANULOCYTE: 0.05 K/UL (ref 0–0.3)
ABSOLUTE LYMPH #: 2.57 K/UL (ref 1.1–3.7)
ABSOLUTE LYMPH #: 2.57 K/UL (ref 1.1–3.7)
ABSOLUTE MONO #: 1.28 K/UL (ref 0.1–1.2)
ABSOLUTE MONO #: 1.28 K/UL (ref 0.1–1.2)
ALBUMIN SERPL-MCNC: 3.8 G/DL (ref 3.5–5.2)
ALBUMIN/GLOBULIN RATIO: 1.3 (ref 1–2.5)
ALP BLD-CCNC: 73 U/L (ref 40–129)
ALT SERPL-CCNC: 14 U/L (ref 5–41)
ALT SERPL-CCNC: 14 U/L (ref 5–41)
ANION GAP SERPL CALCULATED.3IONS-SCNC: 11 MMOL/L (ref 9–17)
ANION GAP SERPL CALCULATED.3IONS-SCNC: 11 MMOL/L (ref 9–17)
AST SERPL-CCNC: 17 U/L
AST SERPL-CCNC: 17 U/L
BASOPHILS # BLD: 1 % (ref 0–2)
BASOPHILS # BLD: 1 % (ref 0–2)
BASOPHILS ABSOLUTE: 0.08 K/UL (ref 0–0.2)
BASOPHILS ABSOLUTE: 0.08 K/UL (ref 0–0.2)
BILIRUB SERPL-MCNC: 0.38 MG/DL (ref 0.3–1.2)
BUN BLDV-MCNC: 17 MG/DL (ref 8–23)
BUN BLDV-MCNC: 17 MG/DL (ref 8–23)
BUN/CREAT BLD: ABNORMAL (ref 9–20)
BUN/CREAT BLD: ABNORMAL (ref 9–20)
CALCIUM SERPL-MCNC: 9.4 MG/DL (ref 8.6–10.4)
CALCIUM SERPL-MCNC: 9.4 MG/DL (ref 8.6–10.4)
CHLORIDE BLD-SCNC: 108 MMOL/L (ref 98–107)
CHLORIDE BLD-SCNC: 108 MMOL/L (ref 98–107)
CHOLESTEROL, FASTING: 164 MG/DL
CHOLESTEROL/HDL RATIO: 4.3
CHOLESTEROL/HDL RATIO: 4.3
CHOLESTEROL: 164 MG/DL
CO2: 25 MMOL/L (ref 20–31)
CO2: 25 MMOL/L (ref 20–31)
CREAT SERPL-MCNC: 1.16 MG/DL (ref 0.7–1.2)
CREAT SERPL-MCNC: 1.16 MG/DL (ref 0.7–1.2)
DIFFERENTIAL TYPE: ABNORMAL
DIFFERENTIAL TYPE: ABNORMAL
EOSINOPHILS RELATIVE PERCENT: 3 % (ref 1–4)
EOSINOPHILS RELATIVE PERCENT: 3 % (ref 1–4)
GFR AFRICAN AMERICAN: >60 ML/MIN
GFR AFRICAN AMERICAN: >60 ML/MIN
GFR NON-AFRICAN AMERICAN: >60 ML/MIN
GFR NON-AFRICAN AMERICAN: >60 ML/MIN
GFR SERPL CREATININE-BSD FRML MDRD: ABNORMAL ML/MIN/{1.73_M2}
GLUCOSE BLD-MCNC: 70 MG/DL (ref 70–99)
GLUCOSE BLD-MCNC: 70 MG/DL (ref 70–99)
HCT VFR BLD CALC: 48.3 % (ref 40.7–50.3)
HCT VFR BLD CALC: 48.3 % (ref 40.7–50.3)
HDLC SERPL-MCNC: 38 MG/DL
HDLC SERPL-MCNC: 38 MG/DL
HEMOGLOBIN: 15.5 G/DL (ref 13–17)
HEMOGLOBIN: 15.5 G/DL (ref 13–17)
IMMATURE GRANULOCYTES: 1 %
IMMATURE GRANULOCYTES: 1 %
LDL CHOLESTEROL: 106 MG/DL (ref 0–130)
LDL CHOLESTEROL: 106 MG/DL (ref 0–130)
LYMPHOCYTES # BLD: 27 % (ref 24–43)
LYMPHOCYTES # BLD: 27 % (ref 24–43)
MCH RBC QN AUTO: 31.1 PG (ref 25.2–33.5)
MCH RBC QN AUTO: 31.1 PG (ref 25.2–33.5)
MCHC RBC AUTO-ENTMCNC: 32.1 G/DL (ref 28.4–34.8)
MCHC RBC AUTO-ENTMCNC: 32.1 G/DL (ref 28.4–34.8)
MCV RBC AUTO: 97 FL (ref 82.6–102.9)
MCV RBC AUTO: 97 FL (ref 82.6–102.9)
MONOCYTES # BLD: 14 % (ref 3–12)
MONOCYTES # BLD: 14 % (ref 3–12)
NRBC AUTOMATED: 0 PER 100 WBC
NRBC AUTOMATED: 0 PER 100 WBC
PDW BLD-RTO: 13.8 % (ref 11.8–14.4)
PDW BLD-RTO: 13.8 % (ref 11.8–14.4)
PLATELET # BLD: 306 K/UL (ref 138–453)
PLATELET # BLD: 306 K/UL (ref 138–453)
PLATELET ESTIMATE: ABNORMAL
PLATELET ESTIMATE: ABNORMAL
PMV BLD AUTO: 10.7 FL (ref 8.1–13.5)
PMV BLD AUTO: 10.7 FL (ref 8.1–13.5)
POTASSIUM SERPL-SCNC: 4.7 MMOL/L (ref 3.7–5.3)
POTASSIUM SERPL-SCNC: 4.7 MMOL/L (ref 3.7–5.3)
PROSTATE SPECIFIC ANTIGEN: 17.59 UG/L
RBC # BLD: 4.98 M/UL (ref 4.21–5.77)
RBC # BLD: 4.98 M/UL (ref 4.21–5.77)
RBC # BLD: ABNORMAL 10*6/UL
RBC # BLD: ABNORMAL 10*6/UL
SEG NEUTROPHILS: 54 % (ref 36–65)
SEG NEUTROPHILS: 55 % (ref 36–65)
SEGMENTED NEUTROPHILS ABSOLUTE COUNT: 5.15 K/UL (ref 1.5–8.1)
SEGMENTED NEUTROPHILS ABSOLUTE COUNT: 5.15 K/UL (ref 1.5–8.1)
SODIUM BLD-SCNC: 144 MMOL/L (ref 135–144)
SODIUM BLD-SCNC: 144 MMOL/L (ref 135–144)
TOTAL PROTEIN: 6.7 G/DL (ref 6.4–8.3)
TRIGL SERPL-MCNC: 102 MG/DL
TRIGLYCERIDE, FASTING: 102 MG/DL
URIC ACID: 6.5 MG/DL (ref 3.4–7)
VLDLC SERPL CALC-MCNC: ABNORMAL MG/DL (ref 1–30)
VLDLC SERPL CALC-MCNC: ABNORMAL MG/DL (ref 1–30)
WBC # BLD: 9.4 K/UL (ref 3.5–11.3)
WBC # BLD: 9.4 K/UL (ref 3.5–11.3)
WBC # BLD: ABNORMAL 10*3/UL
WBC # BLD: ABNORMAL 10*3/UL

## 2019-12-07 PROCEDURE — 36415 COLL VENOUS BLD VENIPUNCTURE: CPT

## 2019-12-07 PROCEDURE — 85025 COMPLETE CBC W/AUTO DIFF WBC: CPT

## 2019-12-07 PROCEDURE — 84450 TRANSFERASE (AST) (SGOT): CPT

## 2019-12-07 PROCEDURE — 84460 ALANINE AMINO (ALT) (SGPT): CPT

## 2019-12-07 PROCEDURE — 80053 COMPREHEN METABOLIC PANEL: CPT

## 2019-12-07 PROCEDURE — 80061 LIPID PANEL: CPT

## 2019-12-07 PROCEDURE — 80048 BASIC METABOLIC PNL TOTAL CA: CPT

## 2019-12-07 PROCEDURE — 84550 ASSAY OF BLOOD/URIC ACID: CPT

## 2019-12-07 PROCEDURE — G0103 PSA SCREENING: HCPCS

## 2019-12-10 ENCOUNTER — TELEPHONE (OUTPATIENT)
Dept: PRIMARY CARE CLINIC | Age: 64
End: 2019-12-10

## 2019-12-10 DIAGNOSIS — R97.20 ELEVATED PSA: Primary | ICD-10-CM

## 2020-01-16 ENCOUNTER — OFFICE VISIT (OUTPATIENT)
Dept: PRIMARY CARE CLINIC | Age: 65
End: 2020-01-16
Payer: COMMERCIAL

## 2020-01-16 VITALS
HEART RATE: 63 BPM | SYSTOLIC BLOOD PRESSURE: 94 MMHG | OXYGEN SATURATION: 95 % | BODY MASS INDEX: 29.57 KG/M2 | WEIGHT: 218 LBS | TEMPERATURE: 97.3 F | DIASTOLIC BLOOD PRESSURE: 62 MMHG

## 2020-01-16 PROCEDURE — 99397 PER PM REEVAL EST PAT 65+ YR: CPT | Performed by: PHYSICIAN ASSISTANT

## 2020-01-16 PROCEDURE — 90653 IIV ADJUVANT VACCINE IM: CPT | Performed by: PHYSICIAN ASSISTANT

## 2020-01-16 PROCEDURE — 90471 IMMUNIZATION ADMIN: CPT | Performed by: PHYSICIAN ASSISTANT

## 2020-01-16 RX ORDER — TAMSULOSIN HYDROCHLORIDE 0.4 MG/1
0.4 CAPSULE ORAL 2 TIMES DAILY
Qty: 60 CAPSULE | Refills: 5 | Status: SHIPPED | OUTPATIENT
Start: 2020-01-16 | End: 2020-03-03 | Stop reason: SDUPTHER

## 2020-01-16 RX ORDER — ATORVASTATIN CALCIUM 80 MG/1
80 TABLET, FILM COATED ORAL DAILY
Qty: 30 TABLET | Refills: 5 | Status: SHIPPED | OUTPATIENT
Start: 2020-01-16 | End: 2020-03-03 | Stop reason: SDUPTHER

## 2020-01-16 RX ORDER — FENOFIBRATE 145 MG/1
145 TABLET, COATED ORAL DAILY
Qty: 30 TABLET | Refills: 5 | Status: SHIPPED | OUTPATIENT
Start: 2020-01-16 | End: 2020-03-03 | Stop reason: SDUPTHER

## 2020-01-16 RX ORDER — ALLOPURINOL 100 MG/1
100 TABLET ORAL DAILY
Qty: 30 TABLET | Refills: 5 | Status: SHIPPED | OUTPATIENT
Start: 2020-01-16 | End: 2020-03-03 | Stop reason: SDUPTHER

## 2020-01-16 RX ORDER — LISINOPRIL 5 MG/1
5 TABLET ORAL DAILY
Qty: 30 TABLET | Refills: 5 | Status: SHIPPED | OUTPATIENT
Start: 2020-01-16 | End: 2020-03-03 | Stop reason: SDUPTHER

## 2020-01-16 ASSESSMENT — PATIENT HEALTH QUESTIONNAIRE - PHQ9
SUM OF ALL RESPONSES TO PHQ QUESTIONS 1-9: 0
1. LITTLE INTEREST OR PLEASURE IN DOING THINGS: 0
SUM OF ALL RESPONSES TO PHQ9 QUESTIONS 1 & 2: 0
2. FEELING DOWN, DEPRESSED OR HOPELESS: 0
SUM OF ALL RESPONSES TO PHQ QUESTIONS 1-9: 0

## 2020-01-16 NOTE — PROGRESS NOTES
[facility] [facility]  [address] [address]  Dept: [phone]  Dept Fax: [phone]    Office Progress/Follow Up Note    Date of patient's visit: 1/16/2020    Patient's Name:  Cait Knox YOB: 1955            Patient Care Team:  Grayson Cleveland PA-C as PCP - General  Grayson Cleveland PA-C as PCP - [facility]  Omar Chamberlain MD as Consulting Physician (Gastroenterology)  ================================================================    REASON FOR VISIT/CHIEF COMPLAINT:  Hypertension and Health Maintenance (will discuss with provider)    HISTORY OF PRESENTING ILLNESS:  History was obtained from: patient. Cait Knox is a 71 y.o. is here for follow-up for BPH, physical.  Patient wife is present. Patient states he is compliant with medications. Patient voiced concern with urinary frequency, currently taking Flomax daily, \"still, informed patient we will increase his Flomax to twice daily, instructed patient to contact increased frequency with urination\" PCP office to update on symptoms, patient voiced understanding. Patient is hypotensive in office. Patient has lost 9 pounds. Patient requesting additional medication refills. Labs reviewed, patient had lab work completed, elevated PSA, otherwise unremarkable. Patient was seen by urology in the past for elevated PSA, biopsy was negative. Health maintenance reviewed, discussed lung cancer and AAA screening in depth with patient, patient was provided information on lung cancer and AAA screening and will follow up with patient next appointment, discussed influenza vaccination in depth with patient, patient consented and influenza vaccine administered in office. Medications reviewed, prescribed Flomax 0.4 mg twice daily, refill lisinopril 5 mg, TriCor 145 mg, Lipitor 80 mg, Zyloprim 100 mg.     HPI    Patient Active Problem List shots per week    Drug use: No       Family History   Problem Relation Age of Onset    High Blood Pressure Father         REVIEW OFSYSTEMS:  Review of Systems   Constitutional: Negative for chills and fever. HENT: Negative for congestion, hearing loss, rhinorrhea and sore throat. Eyes: Negative for pain and visual disturbance. Respiratory: Negative for cough and shortness of breath. Cardiovascular: Negative for chest pain. Gastrointestinal: Negative for abdominal pain, constipation, diarrhea, nausea and vomiting. Genitourinary: Positive for frequency and urgency. Negative for difficulty urinating and dysuria. Musculoskeletal: Negative for arthralgias, back pain and myalgias. Neurological: Negative for dizziness and headaches. PHYSICAL EXAM:  Vitals:    01/16/20 1543   BP: 94/62   Site: Right Upper Arm   Position: Sitting   Cuff Size: Large Adult   Pulse: 63   Temp: 97.3 °F (36.3 °C)   TempSrc: Oral   SpO2: 95%   Weight: 218 lb (98.9 kg)     BP Readings from Last 3 Encounters:   01/16/20 94/62   08/15/19 138/72   03/14/19 101/67        Physical Exam  Vitals signs reviewed. Constitutional:       Appearance: He is well-developed. HENT:      Head: Normocephalic and atraumatic. Right Ear: Hearing and external ear normal.      Left Ear: Hearing and external ear normal.      Nose: Nose normal.      Mouth/Throat:      Pharynx: Uvula midline. Eyes:      General: Lids are normal.      Conjunctiva/sclera: Conjunctivae normal.      Pupils: Pupils are equal, round, and reactive to light. Cardiovascular:      Rate and Rhythm: Normal rate and regular rhythm. Heart sounds: Normal heart sounds. Pulmonary:      Effort: Pulmonary effort is normal.      Breath sounds: Normal breath sounds. Abdominal:      Palpations: Abdomen is soft. Tenderness: There is no tenderness. Musculoskeletal: Normal range of motion. General: No tenderness or deformity.    Skin:     General: Skin is warm and dry. Neurological:      Mental Status: He is alert and oriented to person, place, and time. Psychiatric:         Speech: Speech normal.         Behavior: Behavior normal. Behavior is cooperative. Thought Content: Thought content normal.         Judgment: Judgment normal.           DIAGNOSTIC FINDINGS:  CBC:  Lab Results   Component Value Date    WBC 9.4 12/07/2019    HGB 15.5 12/07/2019     12/07/2019       BMP:    Lab Results   Component Value Date     12/07/2019    K 4.7 12/07/2019     12/07/2019    CO2 25 12/07/2019    BUN 17 12/07/2019    CREATININE 1.16 12/07/2019    GLUCOSE 70 12/07/2019       HEMOGLOBIN A1C: No results found for: LABA1C    FASTING LIPID PANEL:  Lab Results   Component Value Date    CHOL 164 12/07/2019    HDL 38 (L) 12/07/2019    TRIG 102 12/07/2019       ASSESSMENT AND PLAN:  Rupal Cartagena was seen today for hypertension and health maintenance. Diagnoses and all orders for this visit:    Benign prostatic hyperplasia with urinary frequency  -     tamsulosin (FLOMAX) 0.4 MG capsule; Take 1 capsule by mouth 2 times daily    Overweight    Needs flu shot  -     Influenza, Triv, Inactivated, Subunit, Adjuvanted, 65 yrs and older, IM, Prefill Syr, 0.5mL (FLUAD TRIV)    Medication refill  -     lisinopril (PRINIVIL;ZESTRIL) 5 MG tablet; Take 1 tablet by mouth daily  -     fenofibrate (TRICOR) 145 MG tablet; Take 1 tablet by mouth daily  -     atorvastatin (LIPITOR) 80 MG tablet; Take 1 tablet by mouth daily  -     allopurinol (ZYLOPRIM) 100 MG tablet; Take 1 tablet by mouth daily    Annual physical exam      FOLLOW UP AND INSTRUCTIONS:  Return in about 5 months (around 6/16/2020) for BPH, HM. · Discussed use, benefit, and side effects of prescribed medications. Barriers to medication compliance addressed. All patient questions answered. Pt voiced understanding.      · Patient given educational materials - see patient instructions    Electronically signed by Abril Jim PA-C on 1/16/20 at 4:06 PM    This note is created with the assistance of a speech-recognition program. While intendingto generate a document that actually reflects the content of the visit, the document can still have some mistakes which may not have been identified and corrected by editing.

## 2020-01-16 NOTE — PROGRESS NOTES
Chronic Disease Visit Information    BP Readings from Last 3 Encounters:   08/15/19 138/72   03/14/19 101/67   11/12/18 132/67          LDL Cholesterol (mg/dL)   Date Value   12/07/2019 106     LDL Calculated (mg/dL)   Date Value   05/27/2015 76     HDL (mg/dL)   Date Value   12/07/2019 38 (L)     BUN (mg/dL)   Date Value   12/07/2019 17     CREATININE (mg/dL)   Date Value   12/07/2019 1.16     Glucose (mg/dL)   Date Value   12/07/2019 70            Have you changed or started any medications since your last visit including any over-the-counter medicines, vitamins, or herbal medicines? no   Are you having any side effects from any of your medications? -  no  Have you stopped taking any of your medications? Is so, why? -  Yes stopped chantix    Have you seen any other physician or provider since your last visit? No  Have you had any other diagnostic tests since your last visit? No  Have you been seen in the emergency room and/or had an admission to a hospital since we last saw you? No  Have you had your annual diabetic retinal (eye) exam? No  Have you had your routine dental cleaning in the past 6 months? no    Have you activated your [employer] account? If not, what are your barriers?  Yes     Patient Care Team:  Donato Alvarado PA-C as PCP - General  Donato Alvarado PA-C as PCP - [facility]  Marilee Urban MD as Consulting Physician (Gastroenterology)         Medical History Review  Past Medical, Family, and Social History reviewed and does contribute to the patient presenting condition    Health Maintenance   Topic Date Due    AAA screen  1955    HIV screen  05/21/1969    Shingles Vaccine (1 of 2) 05/21/2004    Low dose CT lung screening  05/21/2009    Flu vaccine (1) 09/01/2019    Pneumococcal 65+ years Vaccine (2 of 2 - PPSV23) 08/15/2020    Lipid screen  12/07/2020    Potassium monitoring  12/07/2020    Creatinine monitoring  12/07/2020    DTaP/Tdap/Td vaccine (2 - Td) 04/05/2022  Colon cancer screen colonoscopy  10/27/2024    Hepatitis C screen  Completed

## 2020-01-23 ASSESSMENT — ENCOUNTER SYMPTOMS
VOMITING: 0
EYE PAIN: 0
RHINORRHEA: 0
ABDOMINAL PAIN: 0
CONSTIPATION: 0
SORE THROAT: 0
NAUSEA: 0
COUGH: 0
DIARRHEA: 0
BACK PAIN: 0
SHORTNESS OF BREATH: 0

## 2020-03-03 ENCOUNTER — TELEPHONE (OUTPATIENT)
Dept: PRIMARY CARE CLINIC | Age: 65
End: 2020-03-03

## 2020-03-03 DIAGNOSIS — Z76.0 MEDICATION REFILL: ICD-10-CM

## 2020-03-03 DIAGNOSIS — N40.1 BENIGN PROSTATIC HYPERPLASIA WITH URINARY FREQUENCY: ICD-10-CM

## 2020-03-03 DIAGNOSIS — R35.0 BENIGN PROSTATIC HYPERPLASIA WITH URINARY FREQUENCY: ICD-10-CM

## 2020-03-03 RX ORDER — LISINOPRIL 5 MG/1
5 TABLET ORAL DAILY
Qty: 90 TABLET | Refills: 1 | Status: SHIPPED | OUTPATIENT
Start: 2020-03-03 | End: 2020-03-30 | Stop reason: SDUPTHER

## 2020-03-03 RX ORDER — ATORVASTATIN CALCIUM 80 MG/1
80 TABLET, FILM COATED ORAL DAILY
Qty: 90 TABLET | Refills: 1 | Status: SHIPPED | OUTPATIENT
Start: 2020-03-03 | End: 2020-03-30 | Stop reason: SDUPTHER

## 2020-03-03 RX ORDER — ALLOPURINOL 100 MG/1
100 TABLET ORAL DAILY
Qty: 90 TABLET | Refills: 1 | Status: SHIPPED | OUTPATIENT
Start: 2020-03-03 | End: 2020-03-30 | Stop reason: SDUPTHER

## 2020-03-03 RX ORDER — FENOFIBRATE 145 MG/1
145 TABLET, COATED ORAL DAILY
Qty: 90 TABLET | Refills: 1 | Status: SHIPPED | OUTPATIENT
Start: 2020-03-03 | End: 2020-03-30 | Stop reason: SDUPTHER

## 2020-03-03 RX ORDER — TAMSULOSIN HYDROCHLORIDE 0.4 MG/1
0.4 CAPSULE ORAL 2 TIMES DAILY
Qty: 180 CAPSULE | Refills: 1 | Status: SHIPPED | OUTPATIENT
Start: 2020-03-03 | End: 2020-03-30

## 2020-03-30 RX ORDER — TAMSULOSIN HYDROCHLORIDE 0.4 MG/1
0.4 CAPSULE ORAL 2 TIMES DAILY
Qty: 180 CAPSULE | Refills: 0 | Status: SHIPPED | OUTPATIENT
Start: 2020-03-30 | End: 2020-06-10

## 2020-03-30 RX ORDER — FENOFIBRATE 145 MG/1
145 TABLET, COATED ORAL DAILY
Qty: 90 TABLET | Refills: 0 | Status: SHIPPED | OUTPATIENT
Start: 2020-03-30 | End: 2020-06-10

## 2020-03-30 RX ORDER — LISINOPRIL 5 MG/1
5 TABLET ORAL DAILY
Qty: 90 TABLET | Refills: 0 | Status: SHIPPED | OUTPATIENT
Start: 2020-03-30 | End: 2020-06-10

## 2020-03-30 RX ORDER — ALLOPURINOL 100 MG/1
100 TABLET ORAL DAILY
Qty: 90 TABLET | Refills: 0 | Status: SHIPPED | OUTPATIENT
Start: 2020-03-30 | End: 2020-06-10

## 2020-03-30 RX ORDER — ATORVASTATIN CALCIUM 80 MG/1
80 TABLET, FILM COATED ORAL DAILY
Qty: 90 TABLET | Refills: 0 | Status: SHIPPED | OUTPATIENT
Start: 2020-03-30 | End: 2020-06-10

## 2020-09-17 ENCOUNTER — TELEPHONE (OUTPATIENT)
Dept: PRIMARY CARE CLINIC | Age: 65
End: 2020-09-17

## 2020-10-13 ENCOUNTER — OFFICE VISIT (OUTPATIENT)
Dept: PRIMARY CARE CLINIC | Age: 65
End: 2020-10-13
Payer: COMMERCIAL

## 2020-10-13 VITALS
DIASTOLIC BLOOD PRESSURE: 66 MMHG | TEMPERATURE: 98.2 F | BODY MASS INDEX: 29.57 KG/M2 | SYSTOLIC BLOOD PRESSURE: 126 MMHG | HEART RATE: 65 BPM | OXYGEN SATURATION: 97 % | HEIGHT: 72 IN

## 2020-10-13 PROCEDURE — 99214 OFFICE O/P EST MOD 30 MIN: CPT | Performed by: PHYSICIAN ASSISTANT

## 2020-10-13 PROCEDURE — 90694 VACC AIIV4 NO PRSRV 0.5ML IM: CPT | Performed by: PHYSICIAN ASSISTANT

## 2020-10-13 PROCEDURE — 90472 IMMUNIZATION ADMIN EACH ADD: CPT | Performed by: PHYSICIAN ASSISTANT

## 2020-10-13 PROCEDURE — 90732 PPSV23 VACC 2 YRS+ SUBQ/IM: CPT | Performed by: PHYSICIAN ASSISTANT

## 2020-10-13 PROCEDURE — 90471 IMMUNIZATION ADMIN: CPT | Performed by: PHYSICIAN ASSISTANT

## 2020-10-13 RX ORDER — ALLOPURINOL 100 MG/1
100 TABLET ORAL DAILY
Qty: 90 TABLET | Refills: 1 | Status: SHIPPED | OUTPATIENT
Start: 2020-10-13 | End: 2021-03-18 | Stop reason: SDUPTHER

## 2020-10-13 RX ORDER — LISINOPRIL 5 MG/1
5 TABLET ORAL DAILY
Qty: 90 TABLET | Refills: 1 | Status: SHIPPED | OUTPATIENT
Start: 2020-10-13 | End: 2021-03-18 | Stop reason: SDUPTHER

## 2020-10-13 RX ORDER — TAMSULOSIN HYDROCHLORIDE 0.4 MG/1
0.4 CAPSULE ORAL 2 TIMES DAILY
Qty: 180 CAPSULE | Refills: 1 | Status: SHIPPED | OUTPATIENT
Start: 2020-10-13 | End: 2021-03-18 | Stop reason: SDUPTHER

## 2020-10-13 RX ORDER — ATORVASTATIN CALCIUM 80 MG/1
80 TABLET, FILM COATED ORAL NIGHTLY
Qty: 90 TABLET | Refills: 1 | Status: SHIPPED | OUTPATIENT
Start: 2020-10-13 | End: 2021-03-18 | Stop reason: SDUPTHER

## 2020-10-13 RX ORDER — FENOFIBRATE 145 MG/1
145 TABLET, COATED ORAL DAILY
Qty: 90 TABLET | Refills: 1 | Status: SHIPPED | OUTPATIENT
Start: 2020-10-13 | End: 2021-03-18 | Stop reason: SDUPTHER

## 2020-10-13 NOTE — PATIENT INSTRUCTIONS
· Read lung cancer screening and AAA screening information  · Call and schedule appointment with cardiology        Patient Education        Learning About Lung Cancer Screening  What is screening for lung cancer? Lung cancer screening is a way to find some lung cancers early, before a person has any symptoms of the cancer. Lung cancer screening may help those who have the highest risk for lung cancer--people 50 to 55 and older who are or were heavy smokers. For most people, who aren't at increased risk, screening for lung cancer probably isn't helpful. Screening won't prevent cancer. And it may not find all lung cancers. Lung cancer screening may lower the risk of dying from lung cancer in a small number of people. How is it done? Lung cancer screening is done with a low-dose CT (computed tomography) scan. A CT scan uses X-rays, or radiation, to make detailed pictures of your body. Experts recommend that screening be done in medical centers that focus on finding and treating lung cancer. Who is screening recommended for? Lung cancer screening is recommended for people who are or were heavy smokers. That means people with a smoking history of at least 20 or 30 pack years. A pack year is a way to measure how heavy a smoker you are or were. To figure out your pack years, multiply how many packs a day on average (assuming 20 cigarettes per pack) you have smoked by how many years you have smoked. For example: If you smoked 1 pack a day for 15 years, that's 1 times 15. So you have a smoking history of 15 pack years. If you smoked 1 pack a day for 20 years, that's 1 times 20. So you have a smoking history of 20 pack years. If you smoked 2 packs a day for 15 years, that's 2 times 15. So you have a smoking history of 30 pack years. Experts agree that screening is for people who have a high risk of lung cancer. But experts don't agree on what high risk means.  Some say people age 48 or older with at least a 20-pack-year smoking history are high risk. Others say it's people age 54 or older with a 30-pack-year history. Experts may also have other rules about who should be screened. For example, here is the guideline from the U.[place]. Preventive Services Task Force, which recommends lung cancer screening if:  You are 54to [de-identified]years old. And you have a smoking history of at least 30 pack years. And you still smoke, or you quit within the last 15 years. And you are not in poor health overall. (Having a serious health problem might mean that you couldn't have treatment for lung cancer. The treatment could be too high-risk, and it might not help you live longer.)  What are the risks of screening? CT screening for lung cancer isn't perfect. It can show an abnormal result when it turns out there wasn't any cancer. This is called a false-positive result. This means you may need more tests to make sure you don't have cancer. These tests can be harmful and cause a lot of worry. These tests may include more CT scans and invasive testing like a lung biopsy. In a biopsy, the doctor takes a sample of tissue from inside your lung so it can be looked at under a microscope. A biopsy is the only way to tell if you have lung cancer. If the biopsy finds cancer, you and your doctor will have to decide how or whether to treat it. Some lung cancers found on CT scans are harmless and would not have caused a problem if they had not been found through screening. But because doctors can't tell which ones will turn out to be harmless, most will be treated. This means that you may get treatment--including surgery, radiation, or chemotherapy--that you don't need. There is a risk of damage to cells or tissue from being exposed to radiation, including the small amounts used in CTs, X-rays, and other medical tests. Over time, exposure to radiation may cause cancer and other health problems.  But in most cases, the risk of getting cancer from being exposed to small amounts of radiation is low. It's not a reason to avoid these tests for most people. What are the benefits of screening? Your scan may be normal (negative). For some people who are at higher risk, screening lowers the chance of dying of lung cancer. How much and how long you smoked helps to determine your risk level. Screening can find some cancers early, when treatment may be more likely to work. What happens after screening? The results of your CT scan will be sent to your doctor. Someone from your care team will explain the results of your scan and answer any questions you may have. If you need any follow-up, he or she will help you understand what to do next. After a lung cancer screening, you can go back to your usual activities right away. A lung cancer screening test can't tell if you have lung cancer. If your results are positive, your doctor can't tell whether an abnormal finding is a harmless nodule, cancer, or something else without doing more tests. What can you do to help prevent lung cancer? Some lung cancers can't be prevented. But if you smoke, quitting smoking is the best step you can take to prevent lung cancer. If you want to quit, your doctor can recommend medicines or other ways to help. Follow-up care is a key part of your treatment and safety. Be sure to make and go to all appointments, and call your doctor if you are having problems. It's also a good idea to know your test results and keep a list of the medicines you take. Where can you learn more? Go to https://[employer]vinyl[employer]. org and sign in to your [employer] account. Enter T085 in the [facility] box to learn more about \"Learning About Lung Cancer Screening. \"     If you do not have an account, please click on the \"Sign Up Now\" link. Current as of: April 29, 2020               Content Version: 12.6  © 7018-8108 Healthwise, Incorporated.    Care instructions adapted under license by [facility]. If you have questions about a medical condition or this instruction, always ask your healthcare professional. [address] any warranty or liability for your use of this information. Patient Education        Abdominal Aortic Aneurysm: Care Instructions  Your Care Instructions  An abdominal aortic aneurysm is a stretched and bulging area of the aorta. The aorta is the large blood vessel that takes oxygen-rich blood from the heart to the rest of the body. This type of aneurysm is in the belly, where the aorta takes blood to the lower body. If an aneurysm gets too big, it can cause serious problems. A bulging aorta is weak and can burst, or rupture. This causes life-threatening bleeding. If your doctor has determined that your aneurysm is small and not growing fast, it is safe to watch the aneurysm carefully and wait on surgery. If the aneurysm is larger, surgery may be the safest choice. In some cases, your doctor may be able to put in a type of graft, called a stent, to fix the aneurysm without doing major surgery. Follow-up care is a key part of your treatment and safety. Be sure to make and go to all appointments, and call your doctor if you are having problems. It's also a good idea to know your test results and keep a list of the medicines you take. How can you care for yourself at home? Take your medicines exactly as prescribed. Call your doctor if you think you are having a problem with your medicine. You may take medicine to help lower blood pressure and cholesterol. Follow a heart-healthy diet. Eat lots of fruits and vegetables, whole grains, and low-fat or nonfat dairy foods. Eat lean proteins, such as seafood, lean meats and poultry, eggs, beans, peas, nuts, seeds, and soy products. Limit saturated fat and avoid trans fat. Limit processed food, sodium, alcohol, and sweets. If your doctor recommends it, get more exercise. Walking is a good choice.  Bit by bit, increase the amount you walk every day. Try for at least 30 minutes on most days of the week. Talk to your doctor about when you can do more active workouts. Manage your weight. Being at a healthy weight will not likely change your aortic aneurysm, but it may lower the chance of complications if you ever need surgery. Do not smoke or allow others to smoke around you. Smoking can make your condition worse. If you need help quitting, talk to your doctor about stop-smoking programs and medicines. These can increase your chances of quitting for good. When should you call for help? Call 911 anytime you think you may need emergency care. For example, call if:    You have severe pain in your belly, back, or chest.     You passed out (lost consciousness).     You have severe trouble breathing. Call your doctor now or seek immediate medical care if:    You are dizzy or lightheaded, or you feel like you may faint.     One or both feet change color, are painful, feel cool, or burn or tingle. Watch closely for changes in your health, and be sure to contact your doctor if you have any problems. Where can you learn more? Go to https://[employer].mySkin. org and sign in to your [employer] account. Enter E127 in the [employer] box to learn more about \"Abdominal Aortic Aneurysm: Care Instructions. \"     If you do not have an account, please click on the \"Sign Up Now\" link. Current as of: March 4, 2020               Content Version: 12.6  © 6853-9369 [employer]. Care instructions adapted under license by [facility] ([facility]). If you have questions about a medical condition or this instruction, always ask your healthcare professional. [place] [address] any warranty or liability for your use of this information.

## 2020-10-13 NOTE — PROGRESS NOTES
[facility]  [id number] [facility] [address]  Dept: [phone]  Dept Fax: [phone]    Office Progress/Follow Up Note    Date of patient's visit: 10/13/2020    Patient's Name:  Marcelo Escalona YOB: 1955            Patient Care Team:  Rolando Duff PA-C as PCP - General  Rolando Duff PA-C as PCP - [facility]  Viktor Rueda MD as Consulting Physician (Gastroenterology)  Arabella Garcia MD as Consulting Physician (Cardiology)  ================================================================    REASON FOR VISIT/CHIEF COMPLAINT:  Medication Refill    HISTORY OF PRESENTING ILLNESS:  History was obtained from: patient. Marcelo Escalona is a 76 y.o. is here for follow-up for HLD, BPH, hyperuricemia, high blood pressure, medication refill. Patient states he is compliant with medications    Patient denies any concerns in office. Patient blood pressure controlled in office. Patient weight is stable. Patient requested medication refill for HLD, BPH, hyperuricemia, high blood pressure, patient denies any side effects of these medications. Labs reviewed, informed patient labs will be ordered and to have completed before follow-up appointment, patient voiced understanding. Health maintenance reviewed, patient states \"did not read\" information for lung cancer and AAA screening, information reprinted encourage patient to read and will discuss at follow-up appointment, discussed influenza vaccination and pneumococcal vaccination in depth with patient, patient consented and influenza and pneumonia vaccine administered in office. Medications reviewed, refilled Zyloprim 100 mg, Lipitor 80 mg, TriCor 145 mg, lisinopril 5 mg, Flomax 0.4 mg.     HPI    Patient Active Problem List   Diagnosis    Lymphedema of leg    Tobacco use disorder    Obesity    Stented coronary artery    Tobacco dependence in remission    Acute myocardial infarction ([facility] [address]    Hypertension    Coronary arteriosclerosis in native artery    Tobacco abuse counseling    Anxiety    Elevated PSA    Benign prostatic hyperplasia with lower urinary tract symptoms    Hyperlipidemia    Overweight       Health Maintenance Due   Topic Date Due    AAA screen  1955    Shingles Vaccine (1 of 2) 2004    Low dose CT lung screening  2009       Allergies   Allergen Reactions    Rosuvastatin          Current Outpatient Medications   Medication Sig Dispense Refill    lisinopril (PRINIVIL;ZESTRIL) 5 MG tablet Take 1 tablet by mouth daily 90 tablet 1    atorvastatin (LIPITOR) 80 MG tablet Take 1 tablet by mouth nightly 90 tablet 1    fenofibrate (TRICOR) 145 MG tablet Take 1 tablet by mouth daily 90 tablet 1    allopurinol (ZYLOPRIM) 100 MG tablet Take 1 tablet by mouth daily 90 tablet 1    tamsulosin (FLOMAX) 0.4 MG capsule Take 1 capsule by mouth 2 times daily 180 capsule 1    carvedilol (COREG) 6.25 MG tablet       ranolazine (RANEXA) 1000 MG SR tablet Take 1 tablet by mouth 2 times daily. 60 tablet 0    aspirin 325 MG tablet Take 325 mg by mouth daily.  prasugrel (EFFIENT) 10 MG TABS Take 10 mg by mouth daily.  varenicline (CHANTIX CONTINUING MONTH BELLE) 1 MG tablet Take 1 tablet by mouth 2 times daily (Patient not taking: Reported on 2020) 56 tablet 5     No current facility-administered medications for this visit. Social History     Tobacco Use    Smoking status: Former Smoker     Packs/day: 1.00     Years: 40.00     Pack years: 40.00     Types: Cigarettes     Last attempt to quit: 3/1/2016     Years since quittin.6    Smokeless tobacco: Never Used   Substance Use Topics    Alcohol use:  Yes     Alcohol/week: 0.0 standard drinks     Comment: 12 shots per week    Drug use: No       Family History   Problem Relation Age of Onset    High Blood Pressure Father REVIEW OFSYSTEMS:  Review of Systems   Constitutional: Negative for chills and fever. HENT: Negative for congestion. Respiratory: Negative for cough, shortness of breath and wheezing. Cardiovascular: Negative for chest pain. Gastrointestinal: Negative for constipation, diarrhea, nausea and vomiting. Genitourinary: Negative for dysuria, frequency and urgency. Musculoskeletal: Negative for back pain, myalgias and neck pain. Neurological: Negative for headaches. PHYSICAL EXAM:  Vitals:    10/13/20 1408   BP: 126/66   Site: Right Upper Arm   Position: Sitting   Cuff Size: Medium Adult   Pulse: 65   Temp: 98.2 °F (36.8 °C)   SpO2: 97%   Height: 6' (1.829 m)     BP Readings from Last 3 Encounters:   10/13/20 126/66   01/16/20 94/62   08/15/19 138/72        Physical Exam  Vitals signs reviewed. Constitutional:       Appearance: Normal appearance. He is well-developed, well-groomed and overweight. HENT:      Head: Normocephalic and atraumatic. Right Ear: External ear normal.      Left Ear: External ear normal.      Nose: Nose normal.   Eyes:      General: Lids are normal.      Conjunctiva/sclera: Conjunctivae normal.      Pupils: Pupils are equal, round, and reactive to light. Cardiovascular:      Rate and Rhythm: Normal rate and regular rhythm. Heart sounds: Normal heart sounds. Pulmonary:      Effort: Pulmonary effort is normal.      Breath sounds: Normal breath sounds. Abdominal:      Palpations: Abdomen is soft. There is no mass. Tenderness: There is no abdominal tenderness. Musculoskeletal:         General: No tenderness. Skin:     General: Skin is warm and dry. Neurological:      Mental Status: He is alert and oriented to person, place, and time. Psychiatric:         Behavior: Behavior is cooperative.            DIAGNOSTIC FINDINGS:  CBC:  Lab Results   Component Value Date    WBC 9.4 12/07/2019    HGB 15.5 12/07/2019     12/07/2019       BMP:    Lab Results   Component Value Date     12/07/2019    K 4.7 12/07/2019     12/07/2019    CO2 25 12/07/2019    BUN 17 12/07/2019    CREATININE 1.16 12/07/2019    GLUCOSE 70 12/07/2019       HEMOGLOBIN A1C: No results found for: LABA1C    FASTING LIPID PANEL:  Lab Results   Component Value Date    CHOL 164 12/07/2019    HDL 38 (L) 12/07/2019    TRIG 102 12/07/2019       ASSESSMENT AND PLAN:  Mandeep Quan was seen today for medication refill. Diagnoses and all orders for this visit:    Needs flu shot  -     INFLUENZA, QUADV, ADJUVANTED, 72 YRS =, IM, PF, PREFILL SYR, 0.5ML (FLUAD)    Need for 23-polyvalent pneumococcal polysaccharide vaccine  -     Pneumococcal polysaccharide vaccine 23-valent greater than or equal to 1yo subcutaneous/IM    Medication refill  -     lisinopril (PRINIVIL;ZESTRIL) 5 MG tablet; Take 1 tablet by mouth daily  -     atorvastatin (LIPITOR) 80 MG tablet; Take 1 tablet by mouth nightly  -     fenofibrate (TRICOR) 145 MG tablet; Take 1 tablet by mouth daily  -     allopurinol (ZYLOPRIM) 100 MG tablet; Take 1 tablet by mouth daily  -     tamsulosin (FLOMAX) 0.4 MG capsule; Take 1 capsule by mouth 2 times daily    Benign prostatic hyperplasia with urinary frequency  -     tamsulosin (FLOMAX) 0.4 MG capsule; Take 1 capsule by mouth 2 times daily  -     Psa screening; Future    Essential hypertension  -     lisinopril (PRINIVIL;ZESTRIL) 5 MG tablet; Take 1 tablet by mouth daily  -     CBC Auto Differential; Future  -     Comprehensive Metabolic Panel; Future    Hyperuricemia  -     allopurinol (ZYLOPRIM) 100 MG tablet; Take 1 tablet by mouth daily  -     Uric Acid; Future    Mixed hyperlipidemia  -     atorvastatin (LIPITOR) 80 MG tablet; Take 1 tablet by mouth nightly  -     fenofibrate (TRICOR) 145 MG tablet; Take 1 tablet by mouth daily  -     Lipid Panel; Future      FOLLOW UP AND INSTRUCTIONS:  Return for , Lab Review.     · Discussed use, benefit, and side effects of prescribed medications. Barriers to medication compliance addressed. All patient questions answered. Pt voiced understanding. · Patient given educational materials - see patient instructions    Electronically signed by Isela Lipscomb PA-C on 10/13/20 at 2:26 PM EDT    This note is created with the assistance of a speech-recognition program. While intending to generate a document that actually reflects the content of the visit, the document can still have some mistakes which may not have been identified and corrected by editing.

## 2020-10-13 NOTE — PROGRESS NOTES
Visit Information    Have you changed or started any medications since your last visit including any over-the-counter medicines, vitamins, or herbal medicines? no   Are you having any side effects from any of your medications? -  no  Have you stopped taking any of your medications? Is so, why? -  no    Have you seen any other physician or provider since your last visit? No  Have you had any other diagnostic tests since your last visit? No  Have you been seen in the emergency room and/or had an admission to a hospital since we last saw you? No  Have you had your routine dental cleaning in the past 6 months? no    Have you activated your 1-800-[facility]ENT[facility] account? If not, what are your barriers? Yes     Patient Care Team:  Zach Degree, SHIRLEY as PCP - General  Zach Degree, SHIRLEY as PCP - [facility]  Olga Hill MD as Consulting Physician (Gastroenterology)    Medical History Review  Past Medical, Family, and Social History reviewed and does contribute to the patient presenting condition    Health Maintenance   Topic Date Due    AAA screen  1955    Shingles Vaccine (1 of 2) 05/21/2004    Low dose CT lung screening  05/21/2009    Pneumococcal 65+ years Vaccine (2 of 2 - PPSV23) 08/15/2020    Flu vaccine (1) 09/01/2020    Lipid screen  12/07/2020    Potassium monitoring  12/07/2020    Creatinine monitoring  12/07/2020    PSA counseling  12/07/2020    DTaP/Tdap/Td vaccine (2 - Td) 04/05/2022    Colon cancer screen colonoscopy  10/27/2024    Hepatitis C screen  Completed    Hepatitis A vaccine  Aged Out    Hepatitis B vaccine  Aged Out    Hib vaccine  Aged Out    Meningococcal (ACWY) vaccine  Aged Out   Vaccine Information Sheet, \"Influenza - Inactivated\"  given to Zoila Besttracy, or parent/legal guardian of  Zoila Leon and verbalized understanding. Patient responses:    Have you ever had a reaction to a flu vaccine? No  Do you have any current illness?   No  Have you ever had Guillian L[place] Jack Syndrome? No  Do you have a serious allergy to any of the following: Neomycin, Polymyxin, Thimerosal, eggs or egg products? No    Flu vaccine given per order. Please see immunization tab. Risks and benefits explained. Current VIS given.

## 2020-10-21 ASSESSMENT — ENCOUNTER SYMPTOMS
BACK PAIN: 0
SHORTNESS OF BREATH: 0
COUGH: 0
WHEEZING: 0
NAUSEA: 0
CONSTIPATION: 0
DIARRHEA: 0
VOMITING: 0

## 2020-11-06 ENCOUNTER — HOSPITAL ENCOUNTER (OUTPATIENT)
Age: 65
Discharge: HOME | End: 2020-11-06
Payer: COMMERCIAL

## 2020-11-06 LAB
ABSOLUTE EOS #: 0.18 K/UL (ref 0–0.44)
ABSOLUTE IMMATURE GRANULOCYTE: 0.03 K/UL (ref 0–0.3)
ABSOLUTE LYMPH #: 1.82 K/UL (ref 1.1–3.7)
ABSOLUTE MONO #: 0.95 K/UL (ref 0.1–1.2)
ALBUMIN SERPL-MCNC: 3.9 G/DL (ref 3.5–5.2)
ALBUMIN/GLOBULIN RATIO: 1.6 (ref 1–2.5)
ALP BLD-CCNC: 83 U/L (ref 40–129)
ALT SERPL-CCNC: 15 U/L (ref 5–41)
ANION GAP SERPL CALCULATED.3IONS-SCNC: 10 MMOL/L (ref 9–17)
AST SERPL-CCNC: 19 U/L
BASOPHILS # BLD: 1 % (ref 0–2)
BASOPHILS ABSOLUTE: 0.09 K/UL (ref 0–0.2)
BILIRUB SERPL-MCNC: 0.62 MG/DL (ref 0.3–1.2)
BUN BLDV-MCNC: 19 MG/DL (ref 8–23)
BUN/CREAT BLD: ABNORMAL (ref 9–20)
CALCIUM SERPL-MCNC: 9.6 MG/DL (ref 8.6–10.4)
CHLORIDE BLD-SCNC: 106 MMOL/L (ref 98–107)
CHOLESTEROL/HDL RATIO: 2.9
CHOLESTEROL: 126 MG/DL
CO2: 28 MMOL/L (ref 20–31)
CREAT SERPL-MCNC: 1.51 MG/DL (ref 0.7–1.2)
DIFFERENTIAL TYPE: ABNORMAL
EOSINOPHILS RELATIVE PERCENT: 2 % (ref 1–4)
GFR AFRICAN AMERICAN: 56 ML/MIN
GFR NON-AFRICAN AMERICAN: 46 ML/MIN
GFR SERPL CREATININE-BSD FRML MDRD: ABNORMAL ML/MIN/{1.73_M2}
GFR SERPL CREATININE-BSD FRML MDRD: ABNORMAL ML/MIN/{1.73_M2}
GLUCOSE BLD-MCNC: 101 MG/DL (ref 70–99)
HCT VFR BLD CALC: 49.6 % (ref 40.7–50.3)
HDLC SERPL-MCNC: 44 MG/DL
HEMOGLOBIN: 15.9 G/DL (ref 13–17)
IMMATURE GRANULOCYTES: 0 %
LDL CHOLESTEROL: 62 MG/DL (ref 0–130)
LYMPHOCYTES # BLD: 20 % (ref 24–43)
MCH RBC QN AUTO: 31.4 PG (ref 25.2–33.5)
MCHC RBC AUTO-ENTMCNC: 32.1 G/DL (ref 28.4–34.8)
MCV RBC AUTO: 98 FL (ref 82.6–102.9)
MONOCYTES # BLD: 10 % (ref 3–12)
NRBC AUTOMATED: 0 PER 100 WBC
PDW BLD-RTO: 13 % (ref 11.8–14.4)
PLATELET # BLD: 280 K/UL (ref 138–453)
PLATELET ESTIMATE: ABNORMAL
PMV BLD AUTO: 10.7 FL (ref 8.1–13.5)
POTASSIUM SERPL-SCNC: 5.3 MMOL/L (ref 3.7–5.3)
PROSTATE SPECIFIC ANTIGEN: 28.81 UG/L
RBC # BLD: 5.06 M/UL (ref 4.21–5.77)
RBC # BLD: ABNORMAL 10*6/UL
SEG NEUTROPHILS: 67 % (ref 36–65)
SEGMENTED NEUTROPHILS ABSOLUTE COUNT: 6.05 K/UL (ref 1.5–8.1)
SODIUM BLD-SCNC: 144 MMOL/L (ref 135–144)
TOTAL PROTEIN: 6.4 G/DL (ref 6.4–8.3)
TRIGL SERPL-MCNC: 101 MG/DL
URIC ACID: 5.5 MG/DL (ref 3.4–7)
VLDLC SERPL CALC-MCNC: NORMAL MG/DL (ref 1–30)
WBC # BLD: 9.1 K/UL (ref 3.5–11.3)
WBC # BLD: ABNORMAL 10*3/UL

## 2020-11-06 PROCEDURE — 84550 ASSAY OF BLOOD/URIC ACID: CPT

## 2020-11-06 PROCEDURE — 80061 LIPID PANEL: CPT

## 2020-11-06 PROCEDURE — 80053 COMPREHEN METABOLIC PANEL: CPT

## 2020-11-06 PROCEDURE — 85025 COMPLETE CBC W/AUTO DIFF WBC: CPT

## 2020-11-06 PROCEDURE — G0103 PSA SCREENING: HCPCS

## 2020-11-06 PROCEDURE — 36415 COLL VENOUS BLD VENIPUNCTURE: CPT

## 2020-11-09 NOTE — RESULT ENCOUNTER NOTE
PSA still elevated, slightly elevated kidney function, otherwise normal labs  Encourage patient stay hydrated improved kidney function, decrease pop intake, increase water intake  Patient was referred to urology at the beginning of 2020 for evaluation for the elevated PSA, confirm with patient ever was evaluated this year

## 2020-11-11 ENCOUNTER — TELEPHONE (OUTPATIENT)
Dept: PRIMARY CARE CLINIC | Age: 65
End: 2020-11-11

## 2020-11-11 NOTE — TELEPHONE ENCOUNTER
----- Message from Ottoniel Law sent at 11/9/2020  9:34 AM EST -----  Called. Informed pt of PSA results. Pt voiced understanding. Pt stated he was never contacted by Urology.  Needs a new referral.

## 2021-03-18 ENCOUNTER — OFFICE VISIT (OUTPATIENT)
Dept: PRIMARY CARE CLINIC | Age: 66
End: 2021-03-18
Payer: COMMERCIAL

## 2021-03-18 VITALS
WEIGHT: 226 LBS | SYSTOLIC BLOOD PRESSURE: 101 MMHG | OXYGEN SATURATION: 95 % | TEMPERATURE: 97.1 F | HEART RATE: 68 BPM | BODY MASS INDEX: 30.65 KG/M2 | DIASTOLIC BLOOD PRESSURE: 66 MMHG

## 2021-03-18 DIAGNOSIS — Z00.00 ANNUAL PHYSICAL EXAM: Primary | ICD-10-CM

## 2021-03-18 DIAGNOSIS — Z76.0 MEDICATION REFILL: ICD-10-CM

## 2021-03-18 DIAGNOSIS — E66.09 CLASS 1 OBESITY DUE TO EXCESS CALORIES WITHOUT SERIOUS COMORBIDITY WITH BODY MASS INDEX (BMI) OF 30.0 TO 30.9 IN ADULT: ICD-10-CM

## 2021-03-18 DIAGNOSIS — N40.1 BENIGN PROSTATIC HYPERPLASIA WITH URINARY FREQUENCY: ICD-10-CM

## 2021-03-18 DIAGNOSIS — R97.20 ELEVATED PSA: ICD-10-CM

## 2021-03-18 DIAGNOSIS — R35.0 BENIGN PROSTATIC HYPERPLASIA WITH URINARY FREQUENCY: ICD-10-CM

## 2021-03-18 DIAGNOSIS — E78.2 MIXED HYPERLIPIDEMIA: ICD-10-CM

## 2021-03-18 DIAGNOSIS — I10 ESSENTIAL HYPERTENSION: ICD-10-CM

## 2021-03-18 DIAGNOSIS — E79.0 HYPERURICEMIA: ICD-10-CM

## 2021-03-18 PROCEDURE — 99397 PER PM REEVAL EST PAT 65+ YR: CPT | Performed by: PHYSICIAN ASSISTANT

## 2021-03-18 RX ORDER — FENOFIBRATE 145 MG/1
145 TABLET, COATED ORAL DAILY
Qty: 90 TABLET | Refills: 1 | Status: SHIPPED | OUTPATIENT
Start: 2021-03-18

## 2021-03-18 RX ORDER — LISINOPRIL 5 MG/1
5 TABLET ORAL DAILY
Qty: 90 TABLET | Refills: 1 | Status: SHIPPED | OUTPATIENT
Start: 2021-03-18

## 2021-03-18 RX ORDER — TAMSULOSIN HYDROCHLORIDE 0.4 MG/1
0.4 CAPSULE ORAL 2 TIMES DAILY
Qty: 180 CAPSULE | Refills: 1 | Status: SHIPPED | OUTPATIENT
Start: 2021-03-18

## 2021-03-18 RX ORDER — ALLOPURINOL 100 MG/1
100 TABLET ORAL DAILY
Qty: 90 TABLET | Refills: 1 | Status: SHIPPED | OUTPATIENT
Start: 2021-03-18

## 2021-03-18 RX ORDER — ATORVASTATIN CALCIUM 80 MG/1
80 TABLET, FILM COATED ORAL NIGHTLY
Qty: 90 TABLET | Refills: 1 | Status: SHIPPED | OUTPATIENT
Start: 2021-03-18

## 2021-03-18 SDOH — ECONOMIC STABILITY: TRANSPORTATION INSECURITY
IN THE PAST 12 MONTHS, HAS THE LACK OF TRANSPORTATION KEPT YOU FROM MEDICAL APPOINTMENTS OR FROM GETTING MEDICATIONS?: NO

## 2021-03-18 SDOH — ECONOMIC STABILITY: FOOD INSECURITY: WITHIN THE PAST 12 MONTHS, YOU WORRIED THAT YOUR FOOD WOULD RUN OUT BEFORE YOU GOT MONEY TO BUY MORE.: NEVER TRUE

## 2021-03-18 SDOH — ECONOMIC STABILITY: TRANSPORTATION INSECURITY
IN THE PAST 12 MONTHS, HAS LACK OF TRANSPORTATION KEPT YOU FROM MEETINGS, WORK, OR FROM GETTING THINGS NEEDED FOR DAILY LIVING?: NO

## 2021-03-18 SDOH — ECONOMIC STABILITY: INCOME INSECURITY: HOW HARD IS IT FOR YOU TO PAY FOR THE VERY BASICS LIKE FOOD, HOUSING, MEDICAL CARE, AND HEATING?: NOT HARD AT ALL

## 2021-03-18 SDOH — ECONOMIC STABILITY: FOOD INSECURITY: WITHIN THE PAST 12 MONTHS, THE FOOD YOU BOUGHT JUST DIDN'T LAST AND YOU DIDN'T HAVE MONEY TO GET MORE.: NEVER TRUE

## 2021-03-18 ASSESSMENT — PATIENT HEALTH QUESTIONNAIRE - PHQ9
SUM OF ALL RESPONSES TO PHQ9 QUESTIONS 1 & 2: 0
1. LITTLE INTEREST OR PLEASURE IN DOING THINGS: 0
SUM OF ALL RESPONSES TO PHQ QUESTIONS 1-9: 0

## 2021-03-18 NOTE — PATIENT INSTRUCTIONS
· Call and schedule follow up appointment with cardiology  · Call and scheudle appointment with urology for elevated PSA evaluation

## 2021-03-18 NOTE — PROGRESS NOTES
[facility]  [id number] [facility] [address]  Dept: [phone]  Dept Fax: [phone]    Office Progress/Follow Up Note    Date of patient's visit: 3/18/2021    Patient's Name:  Kelsi Craven YOB: 1955            Patient Care Team:  Nelli Dickinson PA-C as PCP - General  Nelli Dickinson PA-C as PCP - [facility]  Edda Leonard MD as Consulting Physician (Gastroenterology)  Hakeem Hammer MD as Consulting Physician (Cardiology)  ================================================================    REASON FOR VISIT/CHIEF COMPLAINT:  Follow-up and Annual Exam    HISTORY OF PRESENTING ILLNESS:  History was obtained from: patient. Kelsi Craven is a 76 y.o. is here for physical, follow-up for medication refill. Patient wife is present. Patient states she is compliant with medications. Patient is seen by cardiology, \"had stress test\" in 2020. Informed patient will obtain records. Patient requesting another referral to urology for evaluation for elevated PSA, new referral ordered, instructed patient to call and schedule. Patient blood pressure controlled in office. Patient requesting high blood pressure medication refill. Patient has gained 8 pounds since 1/2020. Discussed weight loss in depth with patient, encourage patient make changes in diet and the importance of physical activity by exercising multiple times weekly. Patient requesting additional medication refills for HLD, hyperuricemia, BPH, patient denies any side effect or complications of medication. Labs reviewed. Health maintenance reviewed. Medications reviewed and prescribed.     Patient was informed that PCP will be residing in April 2021, encourage patient to get established with another provider in office or contact PCP office to be referred to another primary care patient, patient voiced understanding. HPI    Patient Active Problem List   Diagnosis    Lymphedema of leg    Tobacco use disorder    Obesity    Stented coronary artery    Tobacco dependence in remission    Acute myocardial infarction ([facility] [address]    Hypertension    Coronary arteriosclerosis in native artery    Tobacco abuse counseling    Anxiety    Elevated PSA    Benign prostatic hyperplasia with lower urinary tract symptoms    Hyperlipidemia    Overweight    Hyperuricemia       Health Maintenance Due   Topic Date Due    AAA screen  Never done    COVID-19 Vaccine (1) Never done    Diabetes screen  Never done    Shingles Vaccine (1 of 2) Never done       Allergies   Allergen Reactions    Rosuvastatin          Current Outpatient Medications   Medication Sig Dispense Refill    lisinopril (PRINIVIL;ZESTRIL) 5 MG tablet Take 1 tablet by mouth daily 90 tablet 1    atorvastatin (LIPITOR) 80 MG tablet Take 1 tablet by mouth nightly 90 tablet 1    fenofibrate (TRICOR) 145 MG tablet Take 1 tablet by mouth daily 90 tablet 1    allopurinol (ZYLOPRIM) 100 MG tablet Take 1 tablet by mouth daily 90 tablet 1    tamsulosin (FLOMAX) 0.4 MG capsule Take 1 capsule by mouth 2 times daily 180 capsule 1    carvedilol (COREG) 6.25 MG tablet       ranolazine (RANEXA) 1000 MG SR tablet Take 1 tablet by mouth 2 times daily. 60 tablet 0    aspirin 325 MG tablet Take 325 mg by mouth daily.  prasugrel (EFFIENT) 10 MG TABS Take 10 mg by mouth daily. No current facility-administered medications for this visit. Social History     Tobacco Use    Smoking status: Former Smoker     Packs/day: 1.00     Years: 40.00     Pack years: 40.00     Types: Cigarettes     Quit date: 3/1/2016     Years since quittin.0    Smokeless tobacco: Never Used   Substance Use Topics    Alcohol use:  Yes     Alcohol/week: 0.0 standard drinks     Comment: 12 shots per week    Drug use: No       Family History   Problem Relation Age of Onset    High Blood Pressure Father         REVIEW OFSYSTEMS:  Review of Systems   Constitutional: Negative for chills and fever. HENT: Negative for congestion, hearing loss, rhinorrhea and sore throat. Eyes: Negative for pain and visual disturbance. Respiratory: Negative for cough, shortness of breath and wheezing. Cardiovascular: Negative for chest pain. Gastrointestinal: Negative for abdominal pain, constipation, diarrhea, nausea and vomiting. Genitourinary: Positive for frequency. Negative for difficulty urinating, dysuria and urgency. Musculoskeletal: Negative for arthralgias, back pain and myalgias. Neurological: Negative for dizziness and headaches. PHYSICAL EXAM:  Vitals:    03/18/21 1506   BP: 101/66   Site: Right Upper Arm   Position: Sitting   Cuff Size: Medium Adult   Pulse: 68   Temp: 97.1 °F (36.2 °C)   TempSrc: Temporal   SpO2: 95%   Weight: 226 lb (102.5 kg)     BP Readings from Last 3 Encounters:   03/18/21 101/66   10/13/20 126/66   01/16/20 94/62        Physical Exam  Vitals signs reviewed. Constitutional:       Appearance: Normal appearance. He is well-developed and well-groomed. He is obese. HENT:      Head: Normocephalic and atraumatic. Right Ear: Hearing and external ear normal.      Left Ear: Hearing and external ear normal.      Nose: Nose normal.      Mouth/Throat:      Pharynx: Uvula midline. Eyes:      General: Lids are normal.      Conjunctiva/sclera: Conjunctivae normal.      Pupils: Pupils are equal, round, and reactive to light. Cardiovascular:      Rate and Rhythm: Normal rate and regular rhythm. Heart sounds: Normal heart sounds. Pulmonary:      Effort: Pulmonary effort is normal.      Breath sounds: Normal breath sounds. Abdominal:      Palpations: Abdomen is soft. Tenderness: There is no abdominal tenderness. Musculoskeletal: Normal range of motion. General: No tenderness or deformity. Skin:     General: Skin is warm and dry. Neurological:      Mental Status: He is alert and oriented to person, place, and time. Psychiatric:         Speech: Speech normal.         Behavior: Behavior normal. Behavior is cooperative. Thought Content: Thought content normal.         Judgment: Judgment normal.           DIAGNOSTIC FINDINGS:  CBC:  Lab Results   Component Value Date    WBC 9.1 11/06/2020    HGB 15.9 11/06/2020     11/06/2020       BMP:    Lab Results   Component Value Date     11/06/2020    K 5.3 11/06/2020     11/06/2020    CO2 28 11/06/2020    BUN 19 11/06/2020    CREATININE 1.51 11/06/2020    GLUCOSE 101 11/06/2020       HEMOGLOBIN A1C: No results found for: LABA1C    FASTING LIPID PANEL:  Lab Results   Component Value Date    CHOL 126 11/06/2020    HDL 44 11/06/2020    TRIG 101 11/06/2020       ASSESSMENT AND PLAN:  Stacy Waldron was seen today for follow-up and annual exam.    Diagnoses and all orders for this visit:    Annual physical exam    Elevated PSA  -     AFL([facility]) - Magda Hendricks MD, Urology, [facility]    Class 1 obesity due to excess calories without serious comorbidity with body mass index (BMI) of 30.0 to 30.9 in adult    Essential hypertension  -     lisinopril (PRINIVIL;ZESTRIL) 5 MG tablet; Take 1 tablet by mouth daily    Mixed hyperlipidemia  -     atorvastatin (LIPITOR) 80 MG tablet; Take 1 tablet by mouth nightly  -     fenofibrate (TRICOR) 145 MG tablet; Take 1 tablet by mouth daily    Hyperuricemia  -     allopurinol (ZYLOPRIM) 100 MG tablet; Take 1 tablet by mouth daily    Benign prostatic hyperplasia with urinary frequency  -     tamsulosin (FLOMAX) 0.4 MG capsule; Take 1 capsule by mouth 2 times daily    Medication refill  -     lisinopril (PRINIVIL;ZESTRIL) 5 MG tablet; Take 1 tablet by mouth daily  -     atorvastatin (LIPITOR) 80 MG tablet; Take 1 tablet by mouth nightly  -     fenofibrate (TRICOR) 145 MG tablet;  Take 1 tablet by mouth daily  -     allopurinol (ZYLOPRIM) 100 MG tablet;

## 2021-03-18 NOTE — PROGRESS NOTES
Visit Information    Have you changed or started any medications since your last visit including any over-the-counter medicines, vitamins, or herbal medicines? no   Are you having any side effects from any of your medications? -  no  Have you stopped taking any of your medications? Is so, why? -  no    Have you seen any other physician or provider since your last visit? No  Have you had any other diagnostic tests since your last visit? No  Have you been seen in the emergency room and/or had an admission to a hospital since we last saw you? No  Have you had your routine dental cleaning in the past 6 months? no    Have you activated your to be account? If not, what are your barriers?  Yes     Patient Care Team:  Kiki Denney PA-C as PCP - General  Kiki Denney PA-C as PCP - [facility]  Magy Pendleton MD as Consulting Physician (Gastroenterology)  Ellyn Meckel, MD as Consulting Physician (Cardiology)    Medical History Review  Past Medical, Family, and Social History reviewed and does not contribute to the patient presenting condition    Health Maintenance   Topic Date Due    AAA screen  Never done    COVID-19 Vaccine (1) Never done    Shingles Vaccine (1 of 2) Never done    Low dose CT lung screening  Never done    Lipid screen  11/06/2021    Potassium monitoring  11/06/2021    Creatinine monitoring  11/06/2021    PSA counseling  11/06/2021    DTaP/Tdap/Td vaccine (2 - Td) 04/05/2022    Colon cancer screen colonoscopy  10/27/2024    Flu vaccine  Completed    Pneumococcal 65+ years Vaccine  Completed    Hepatitis C screen  Completed    Hepatitis A vaccine  Aged Out    Hepatitis B vaccine  Aged Out    Hib vaccine  Aged Out    Meningococcal (ACWY) vaccine  Aged Out

## 2021-03-23 ENCOUNTER — TELEPHONE (OUTPATIENT)
Dept: PRIMARY CARE CLINIC | Age: 66
End: 2021-03-23

## 2021-03-23 PROBLEM — E79.0 HYPERURICEMIA: Status: ACTIVE | Noted: 2021-03-23

## 2021-03-23 ASSESSMENT — ENCOUNTER SYMPTOMS
VOMITING: 0
RHINORRHEA: 0
DIARRHEA: 0
CONSTIPATION: 0
SORE THROAT: 0
SHORTNESS OF BREATH: 0
BACK PAIN: 0
NAUSEA: 0
COUGH: 0
WHEEZING: 0
ABDOMINAL PAIN: 0
EYE PAIN: 0